# Patient Record
Sex: FEMALE | Race: WHITE | NOT HISPANIC OR LATINO | Employment: UNEMPLOYED | URBAN - METROPOLITAN AREA
[De-identification: names, ages, dates, MRNs, and addresses within clinical notes are randomized per-mention and may not be internally consistent; named-entity substitution may affect disease eponyms.]

---

## 2023-04-27 ENCOUNTER — LAB (OUTPATIENT)
Dept: LAB | Facility: LAB | Age: 68
End: 2023-04-27
Payer: MEDICARE

## 2023-04-27 ENCOUNTER — OFFICE VISIT (OUTPATIENT)
Dept: PRIMARY CARE | Facility: CLINIC | Age: 68
End: 2023-04-27
Payer: MEDICARE

## 2023-04-27 VITALS
OXYGEN SATURATION: 97 % | TEMPERATURE: 97.8 F | HEART RATE: 61 BPM | BODY MASS INDEX: 37.45 KG/M2 | SYSTOLIC BLOOD PRESSURE: 122 MMHG | WEIGHT: 233 LBS | HEIGHT: 66 IN | DIASTOLIC BLOOD PRESSURE: 78 MMHG

## 2023-04-27 DIAGNOSIS — R11.0 NAUSEA: ICD-10-CM

## 2023-04-27 DIAGNOSIS — Z13.820 ENCOUNTER FOR OSTEOPOROSIS SCREENING IN ASYMPTOMATIC POSTMENOPAUSAL PATIENT: ICD-10-CM

## 2023-04-27 DIAGNOSIS — E55.9 VITAMIN D DEFICIENCY: ICD-10-CM

## 2023-04-27 DIAGNOSIS — R42 DIZZINESS: ICD-10-CM

## 2023-04-27 DIAGNOSIS — H81.90 EPISODIC RECURRENT VERTIGO, UNSPECIFIED LATERALITY: ICD-10-CM

## 2023-04-27 DIAGNOSIS — E11.9 TYPE 2 DIABETES MELLITUS WITHOUT COMPLICATION, WITHOUT LONG-TERM CURRENT USE OF INSULIN (MULTI): ICD-10-CM

## 2023-04-27 DIAGNOSIS — E78.5 HYPERLIPIDEMIA, UNSPECIFIED HYPERLIPIDEMIA TYPE: ICD-10-CM

## 2023-04-27 DIAGNOSIS — I10 HYPERTENSION, UNSPECIFIED TYPE: Chronic | ICD-10-CM

## 2023-04-27 DIAGNOSIS — N95.1 MENOPAUSAL SYMPTOMS: ICD-10-CM

## 2023-04-27 DIAGNOSIS — Z78.0 ENCOUNTER FOR OSTEOPOROSIS SCREENING IN ASYMPTOMATIC POSTMENOPAUSAL PATIENT: ICD-10-CM

## 2023-04-27 DIAGNOSIS — I10 HYPERTENSION, UNSPECIFIED TYPE: Primary | Chronic | ICD-10-CM

## 2023-04-27 DIAGNOSIS — H91.90 HEARING LOSS, UNSPECIFIED HEARING LOSS TYPE, UNSPECIFIED LATERALITY: ICD-10-CM

## 2023-04-27 PROBLEM — J98.4 CALCIFIED GRANULOMA OF LUNG: Status: ACTIVE | Noted: 2023-04-27

## 2023-04-27 PROBLEM — Z96.1: Status: RESOLVED | Noted: 2021-01-08 | Resolved: 2023-04-27

## 2023-04-27 PROBLEM — Z86.2 HISTORY OF SARCOIDOSIS: Status: ACTIVE | Noted: 2023-04-27

## 2023-04-27 PROBLEM — H52.7 REFRACTIVE ERROR: Status: ACTIVE | Noted: 2020-11-06

## 2023-04-27 PROBLEM — H18.519 CORNEA GUTTATA: Status: RESOLVED | Noted: 2020-11-06 | Resolved: 2023-04-27

## 2023-04-27 PROBLEM — I34.1 MVP (MITRAL VALVE PROLAPSE): Status: ACTIVE | Noted: 2023-04-27

## 2023-04-27 PROBLEM — Z98.41: Status: RESOLVED | Noted: 2021-01-08 | Resolved: 2023-04-27

## 2023-04-27 PROBLEM — R51.9 HEADACHE: Status: ACTIVE | Noted: 2023-04-27

## 2023-04-27 PROBLEM — J84.10 CALCIFIED GRANULOMA OF LUNG (MULTI): Status: ACTIVE | Noted: 2023-04-27

## 2023-04-27 PROBLEM — H04.123 DRY EYE SYNDROME OF BOTH EYES: Status: RESOLVED | Noted: 2020-11-06 | Resolved: 2023-04-27

## 2023-04-27 PROBLEM — H25.812 COMBINED FORMS OF AGE-RELATED CATARACT OF LEFT EYE: Status: RESOLVED | Noted: 2020-11-06 | Resolved: 2023-04-27

## 2023-04-27 LAB
ALANINE AMINOTRANSFERASE (SGPT) (U/L) IN SER/PLAS: 17 U/L (ref 7–45)
ALBUMIN (G/DL) IN SER/PLAS: 3.5 G/DL (ref 3.4–5)
ALKALINE PHOSPHATASE (U/L) IN SER/PLAS: 66 U/L (ref 33–136)
ANION GAP IN SER/PLAS: 13 MMOL/L (ref 10–20)
ASPARTATE AMINOTRANSFERASE (SGOT) (U/L) IN SER/PLAS: 16 U/L (ref 9–39)
BASOPHILS (10*3/UL) IN BLOOD BY AUTOMATED COUNT: 0.04 X10E9/L (ref 0–0.1)
BASOPHILS/100 LEUKOCYTES IN BLOOD BY AUTOMATED COUNT: 0.5 % (ref 0–2)
BILIRUBIN TOTAL (MG/DL) IN SER/PLAS: 0.8 MG/DL (ref 0–1.2)
CALCIDIOL (25 OH VITAMIN D3) (NG/ML) IN SER/PLAS: 14 NG/ML
CALCIUM (MG/DL) IN SER/PLAS: 8.5 MG/DL (ref 8.6–10.3)
CARBON DIOXIDE, TOTAL (MMOL/L) IN SER/PLAS: 29 MMOL/L (ref 21–32)
CHLORIDE (MMOL/L) IN SER/PLAS: 98 MMOL/L (ref 98–107)
CHOLESTEROL (MG/DL) IN SER/PLAS: 123 MG/DL (ref 0–199)
CHOLESTEROL IN HDL (MG/DL) IN SER/PLAS: 44.6 MG/DL
CHOLESTEROL/HDL RATIO: 2.8
CREATININE (MG/DL) IN SER/PLAS: 0.69 MG/DL (ref 0.5–1.05)
EOSINOPHILS (10*3/UL) IN BLOOD BY AUTOMATED COUNT: 0.24 X10E9/L (ref 0–0.7)
EOSINOPHILS/100 LEUKOCYTES IN BLOOD BY AUTOMATED COUNT: 2.9 % (ref 0–6)
ERYTHROCYTE DISTRIBUTION WIDTH (RATIO) BY AUTOMATED COUNT: 13.1 % (ref 11.5–14.5)
ERYTHROCYTE MEAN CORPUSCULAR HEMOGLOBIN CONCENTRATION (G/DL) BY AUTOMATED: 33 G/DL (ref 32–36)
ERYTHROCYTE MEAN CORPUSCULAR VOLUME (FL) BY AUTOMATED COUNT: 94 FL (ref 80–100)
ERYTHROCYTES (10*6/UL) IN BLOOD BY AUTOMATED COUNT: 4.69 X10E12/L (ref 4–5.2)
GFR FEMALE: >90 ML/MIN/1.73M2
GLUCOSE (MG/DL) IN SER/PLAS: 278 MG/DL (ref 74–99)
HEMATOCRIT (%) IN BLOOD BY AUTOMATED COUNT: 44.2 % (ref 36–46)
HEMOGLOBIN (G/DL) IN BLOOD: 14.6 G/DL (ref 12–16)
IMMATURE GRANULOCYTES/100 LEUKOCYTES IN BLOOD BY AUTOMATED COUNT: 0.4 % (ref 0–0.9)
LDL: 36 MG/DL (ref 0–99)
LEUKOCYTES (10*3/UL) IN BLOOD BY AUTOMATED COUNT: 8.4 X10E9/L (ref 4.4–11.3)
LYMPHOCYTES (10*3/UL) IN BLOOD BY AUTOMATED COUNT: 2.14 X10E9/L (ref 1.2–4.8)
LYMPHOCYTES/100 LEUKOCYTES IN BLOOD BY AUTOMATED COUNT: 25.6 % (ref 13–44)
MONOCYTES (10*3/UL) IN BLOOD BY AUTOMATED COUNT: 0.46 X10E9/L (ref 0.1–1)
MONOCYTES/100 LEUKOCYTES IN BLOOD BY AUTOMATED COUNT: 5.5 % (ref 2–10)
NEUTROPHILS (10*3/UL) IN BLOOD BY AUTOMATED COUNT: 5.45 X10E9/L (ref 1.2–7.7)
NEUTROPHILS/100 LEUKOCYTES IN BLOOD BY AUTOMATED COUNT: 65.1 % (ref 40–80)
NON HDL CHOLESTEROL: 78 MG/DL
PLATELETS (10*3/UL) IN BLOOD AUTOMATED COUNT: 238 X10E9/L (ref 150–450)
POTASSIUM (MMOL/L) IN SER/PLAS: 4.8 MMOL/L (ref 3.5–5.3)
PROTEIN TOTAL: 5.2 G/DL (ref 6.4–8.2)
SODIUM (MMOL/L) IN SER/PLAS: 135 MMOL/L (ref 136–145)
THYROTROPIN (MIU/L) IN SER/PLAS BY DETECTION LIMIT <= 0.05 MIU/L: 3.23 MIU/L (ref 0.44–3.98)
TRIGLYCERIDE (MG/DL) IN SER/PLAS: 210 MG/DL (ref 0–149)
UREA NITROGEN (MG/DL) IN SER/PLAS: 17 MG/DL (ref 6–23)
VLDL: 42 MG/DL (ref 0–40)

## 2023-04-27 PROCEDURE — 82306 VITAMIN D 25 HYDROXY: CPT

## 2023-04-27 PROCEDURE — 1159F MED LIST DOCD IN RCRD: CPT | Performed by: FAMILY MEDICINE

## 2023-04-27 PROCEDURE — 99215 OFFICE O/P EST HI 40 MIN: CPT | Performed by: FAMILY MEDICINE

## 2023-04-27 PROCEDURE — 3078F DIAST BP <80 MM HG: CPT | Performed by: FAMILY MEDICINE

## 2023-04-27 PROCEDURE — 84443 ASSAY THYROID STIM HORMONE: CPT

## 2023-04-27 PROCEDURE — 83036 HEMOGLOBIN GLYCOSYLATED A1C: CPT

## 2023-04-27 PROCEDURE — 36415 COLL VENOUS BLD VENIPUNCTURE: CPT

## 2023-04-27 PROCEDURE — 80061 LIPID PANEL: CPT

## 2023-04-27 PROCEDURE — 1160F RVW MEDS BY RX/DR IN RCRD: CPT | Performed by: FAMILY MEDICINE

## 2023-04-27 PROCEDURE — 3074F SYST BP LT 130 MM HG: CPT | Performed by: FAMILY MEDICINE

## 2023-04-27 PROCEDURE — 80053 COMPREHEN METABOLIC PANEL: CPT

## 2023-04-27 PROCEDURE — 85025 COMPLETE CBC W/AUTO DIFF WBC: CPT

## 2023-04-27 PROCEDURE — 1036F TOBACCO NON-USER: CPT | Performed by: FAMILY MEDICINE

## 2023-04-27 RX ORDER — GLIPIZIDE 10 MG/1
10 TABLET ORAL
COMMUNITY
End: 2023-08-29 | Stop reason: ALTCHOICE

## 2023-04-27 RX ORDER — LISINOPRIL AND HYDROCHLOROTHIAZIDE 20; 25 MG/1; MG/1
1 TABLET ORAL DAILY
COMMUNITY
End: 2023-08-29 | Stop reason: SDUPTHER

## 2023-04-27 RX ORDER — METFORMIN HYDROCHLORIDE 500 MG/1
2000 TABLET, EXTENDED RELEASE ORAL DAILY
COMMUNITY
End: 2024-01-08 | Stop reason: SDUPTHER

## 2023-04-27 RX ORDER — ONDANSETRON 4 MG/1
4 TABLET, ORALLY DISINTEGRATING ORAL EVERY 8 HOURS PRN
Qty: 20 TABLET | Refills: 0 | Status: SHIPPED | OUTPATIENT
Start: 2023-04-27 | End: 2023-05-04

## 2023-04-27 RX ORDER — VENLAFAXINE HYDROCHLORIDE 75 MG/1
75 TABLET, EXTENDED RELEASE ORAL
COMMUNITY
Start: 2021-03-31 | End: 2023-08-29 | Stop reason: SDUPTHER

## 2023-04-27 RX ORDER — PIOGLITAZONEHYDROCHLORIDE 30 MG/1
45 TABLET ORAL DAILY
COMMUNITY
End: 2024-01-09 | Stop reason: WASHOUT

## 2023-04-27 RX ORDER — ATORVASTATIN CALCIUM 40 MG/1
40 TABLET, FILM COATED ORAL DAILY
COMMUNITY
Start: 2022-11-07 | End: 2023-12-06 | Stop reason: SDUPTHER

## 2023-04-27 RX ORDER — CEPHRADINE 250 MG
2 CAPSULE ORAL DAILY
COMMUNITY
Start: 2021-12-08

## 2023-04-27 RX ORDER — CICLOPIROX 80 MG/ML
SOLUTION TOPICAL
COMMUNITY
Start: 2021-02-23 | End: 2024-01-09 | Stop reason: WASHOUT

## 2023-04-27 ASSESSMENT — ENCOUNTER SYMPTOMS
MYALGIAS: 0
SLEEP DISTURBANCE: 0
BRUISES/BLEEDS EASILY: 0
SHORTNESS OF BREATH: 0
VOMITING: 0
DIZZINESS: 1
POLYDIPSIA: 0
FATIGUE: 0
ABDOMINAL DISTENTION: 0
HEADACHES: 0
PALPITATIONS: 0
DYSPHORIC MOOD: 0
CONSTIPATION: 0
DYSURIA: 0
DIFFICULTY URINATING: 0
POLYPHAGIA: 0
BLOOD IN STOOL: 0
NAUSEA: 0
ABDOMINAL PAIN: 0
DIARRHEA: 0
ARTHRALGIAS: 0

## 2023-04-27 NOTE — PROGRESS NOTES
"Subjective   Patient ID: Kaity Eric is a 67 y.o. female who presents for New Patient Visit, Diabetes (X\"years\". Discuss A1C testing.), and Dizziness (With hearing loss x\"months\".).    Diabetes  Hypoglycemia symptoms include dizziness. Pertinent negatives for hypoglycemia include no headaches. Pertinent negatives for diabetes include no chest pain, no fatigue, no polydipsia, no polyphagia and no polyuria.   Dizziness  Pertinent negatives include no abdominal pain, arthralgias, chest pain, fatigue, headaches, myalgias, nausea, rash or vomiting.      DM: due for recheck. Last a1c 7.4% per pt. Noticing BS increasing gradually. Currently following keto diet.     HTN: controlled    Vertigo: chronic for yr. Starts w/ ringing in ear(mostly rt side) then has vertigo, hearing loss then occ N/V. No assoc Has. PT in past did not help    Vit D/lipids: due for recheck.     Menopause: stable. Was placed on SNRI for hotflashes. Resolved on med. No issues w/ mood    Review of Systems   Constitutional:  Negative for fatigue.   HENT:          As above   Eyes:  Negative for visual disturbance.   Respiratory:  Negative for shortness of breath.    Cardiovascular:  Negative for chest pain and palpitations.   Gastrointestinal:  Negative for abdominal distention, abdominal pain, blood in stool, constipation, diarrhea, nausea and vomiting.   Endocrine: Negative for cold intolerance, heat intolerance, polydipsia, polyphagia and polyuria.   Genitourinary:  Negative for difficulty urinating and dysuria.   Musculoskeletal:  Negative for arthralgias and myalgias.   Skin:  Negative for rash.   Allergic/Immunologic: Negative for environmental allergies.   Neurological:  Positive for dizziness. Negative for headaches.   Hematological:  Does not bruise/bleed easily.   Psychiatric/Behavioral:  Negative for dysphoric mood and sleep disturbance.        Objective   /78   Pulse 61   Temp 36.6 °C (97.8 °F) (Temporal)   Ht 1.676 m (5' 6\")   " Wt 106 kg (233 lb)   SpO2 97%   BMI 37.61 kg/m²     Physical Exam  Vitals reviewed.   Constitutional:       Appearance: Normal appearance.   HENT:      Head: Normocephalic.      Right Ear: Tympanic membrane normal.      Left Ear: Tympanic membrane normal.      Nose: Nose normal.      Mouth/Throat:      Pharynx: Oropharynx is clear.   Eyes:      Pupils: Pupils are equal, round, and reactive to light.   Cardiovascular:      Rate and Rhythm: Normal rate and regular rhythm.      Pulses: Normal pulses.      Heart sounds: No murmur heard.  Pulmonary:      Effort: Pulmonary effort is normal. No respiratory distress.      Breath sounds: Normal breath sounds.   Abdominal:      General: Bowel sounds are normal.      Palpations: Abdomen is soft.      Tenderness: There is no abdominal tenderness. There is no guarding.   Musculoskeletal:      Right lower leg: Edema present.      Left lower leg: Edema present.   Skin:     General: Skin is warm.   Neurological:      General: No focal deficit present.      Mental Status: She is alert.      Cranial Nerves: No cranial nerve deficit.   Psychiatric:         Mood and Affect: Mood normal.         Assessment/Plan   Problem List Items Addressed This Visit          Circulatory    HTN (hypertension) - Primary (Chronic)    Relevant Orders    Comprehensive Metabolic Panel    Lipid Panel    TSH with reflex to Free T4 if abnormal       Endocrine/Metabolic    Type 2 diabetes mellitus without complication, without long-term current use of insulin (CMS/[facility])    Relevant Orders    Comprehensive Metabolic Panel    Hemoglobin A1C    TSH with reflex to Free T4 if abnormal       Other    Hyperlipidemia    Relevant Orders    Comprehensive Metabolic Panel    Lipid Panel    TSH with reflex to Free T4 if abnormal     Other Visit Diagnoses       Dizziness        Relevant Orders    CBC and Auto Differential    Vitamin D deficiency        Relevant Orders    Vitamin D 25-Hydroxy,Total    Episodic recurrent  vertigo, unspecified laterality        Relevant Orders    Referral to ENT    Menopausal symptoms        Relevant Orders    XR DEXA bone density    Hearing loss, unspecified hearing loss type, unspecified laterality        Relevant Orders    Referral to Audiology    Nausea        Relevant Medications    ondansetron ODT (Zofran-ODT) 4 mg disintegrating tablet    Encounter for osteoporosis screening in asymptomatic postmenopausal patient        Relevant Orders    XR DEXA bone density        Reviewed prior records available during visit    Time Spent  Prep time on day of patient encounter: 0 minutes  Time spent directly with patient, family or caregiver: 35 minutes  Additional Time Spent on Patient Care Activities: 0 minutes  Documentation Time: 10 minutes  Other Time Spent: 0 minutes  Total: 45 minutes

## 2023-04-27 NOTE — PATIENT INSTRUCTIONS
Obtain your blood work    You were referred to ENT and audiology    Recommend a predominant whole foods plant based diet.  Cut back on meat, dairy, salt and oils. Increase fiber in your diet.  Decrease alcohol as much as possible if you drink. Recommend regular exercise most days of the week.    Continue your current meds    Follow up in 3 months for recheck and medicare visit, sooner if needed

## 2023-04-28 ENCOUNTER — TELEPHONE (OUTPATIENT)
Dept: PRIMARY CARE | Facility: CLINIC | Age: 68
End: 2023-04-28
Payer: COMMERCIAL

## 2023-04-28 LAB
ESTIMATED AVERAGE GLUCOSE FOR HBA1C: 206 MG/DL
HEMOGLOBIN A1C/HEMOGLOBIN TOTAL IN BLOOD: 8.8 %

## 2023-04-28 NOTE — TELEPHONE ENCOUNTER
Her hemoglobin A1c is 8.8%. Triglycerides are elevated at 210 (goal <150). Vitamin D low at 14. All other labs normal. Will need to see what Dr. Taylor wants to do with her medications. (Please forward to IEL when done)

## 2023-04-28 NOTE — TELEPHONE ENCOUNTER
Patient is wanting her lab results really interested in the A1C. Is asking if another doctor can address this since IEL is out of the office today. Labs in chart.

## 2023-04-28 NOTE — TELEPHONE ENCOUNTER
Called and spoke with pt- informed of lab numbers. Any adjustments need made to patients meds? Thanks, CG

## 2023-05-04 ENCOUNTER — TELEPHONE (OUTPATIENT)
Dept: PRIMARY CARE | Facility: CLINIC | Age: 68
End: 2023-05-04
Payer: COMMERCIAL

## 2023-05-04 NOTE — TELEPHONE ENCOUNTER
Pt came in and stated she was contacted by us to see if she would like to start jardiance, pt said she is going to look into it and will let us know. She would prefer to try diet and exercise before adding more medications.

## 2023-05-09 NOTE — TELEPHONE ENCOUNTER
Pt called back stating she is willing to start medication - jardiance, please send to [facility]

## 2023-05-19 DIAGNOSIS — E11.9 TYPE 2 DIABETES MELLITUS WITHOUT COMPLICATION, WITHOUT LONG-TERM CURRENT USE OF INSULIN (MULTI): ICD-10-CM

## 2023-05-19 DIAGNOSIS — E55.9 VITAMIN D DEFICIENCY: ICD-10-CM

## 2023-05-19 DIAGNOSIS — I10 HYPERTENSION, UNSPECIFIED TYPE: Primary | Chronic | ICD-10-CM

## 2023-05-19 DIAGNOSIS — E78.5 HYPERLIPIDEMIA, UNSPECIFIED HYPERLIPIDEMIA TYPE: ICD-10-CM

## 2023-07-03 ENCOUNTER — TELEPHONE (OUTPATIENT)
Dept: PRIMARY CARE | Facility: CLINIC | Age: 68
End: 2023-07-03
Payer: COMMERCIAL

## 2023-07-03 NOTE — TELEPHONE ENCOUNTER
Pt called stating that since pt started jardiance, she has had issues with yeast infections... should she take 7 day treatment OTC of monistat??? Please advise. Pt states IEL stated this might happen. Pt would like answer today. Thx

## 2023-08-22 ENCOUNTER — LAB (OUTPATIENT)
Dept: LAB | Facility: LAB | Age: 68
End: 2023-08-22
Payer: MEDICARE

## 2023-08-22 DIAGNOSIS — E78.5 HYPERLIPIDEMIA, UNSPECIFIED HYPERLIPIDEMIA TYPE: ICD-10-CM

## 2023-08-22 DIAGNOSIS — E55.9 VITAMIN D DEFICIENCY: ICD-10-CM

## 2023-08-22 DIAGNOSIS — E11.9 TYPE 2 DIABETES MELLITUS WITHOUT COMPLICATION, WITHOUT LONG-TERM CURRENT USE OF INSULIN (MULTI): ICD-10-CM

## 2023-08-22 LAB
ALANINE AMINOTRANSFERASE (SGPT) (U/L) IN SER/PLAS: 19 U/L (ref 7–45)
ALBUMIN (G/DL) IN SER/PLAS: 4.1 G/DL (ref 3.4–5)
ALKALINE PHOSPHATASE (U/L) IN SER/PLAS: 76 U/L (ref 33–136)
ANION GAP IN SER/PLAS: 12 MMOL/L (ref 10–20)
ASPARTATE AMINOTRANSFERASE (SGOT) (U/L) IN SER/PLAS: 20 U/L (ref 9–39)
BILIRUBIN TOTAL (MG/DL) IN SER/PLAS: 0.7 MG/DL (ref 0–1.2)
CALCIDIOL (25 OH VITAMIN D3) (NG/ML) IN SER/PLAS: 26 NG/ML
CALCIUM (MG/DL) IN SER/PLAS: 9.2 MG/DL (ref 8.6–10.3)
CARBON DIOXIDE, TOTAL (MMOL/L) IN SER/PLAS: 26 MMOL/L (ref 21–32)
CHLORIDE (MMOL/L) IN SER/PLAS: 101 MMOL/L (ref 98–107)
CHOLESTEROL (MG/DL) IN SER/PLAS: 101 MG/DL (ref 0–199)
CHOLESTEROL IN HDL (MG/DL) IN SER/PLAS: 39.8 MG/DL
CHOLESTEROL/HDL RATIO: 2.5
CREATININE (MG/DL) IN SER/PLAS: 0.86 MG/DL (ref 0.5–1.05)
GFR FEMALE: 75 ML/MIN/1.73M2
GLUCOSE (MG/DL) IN SER/PLAS: 154 MG/DL (ref 74–99)
LDL: 28 MG/DL (ref 0–99)
POTASSIUM (MMOL/L) IN SER/PLAS: 4.2 MMOL/L (ref 3.5–5.3)
PROTEIN TOTAL: 6.4 G/DL (ref 6.4–8.2)
SODIUM (MMOL/L) IN SER/PLAS: 135 MMOL/L (ref 136–145)
TRIGLYCERIDE (MG/DL) IN SER/PLAS: 164 MG/DL (ref 0–149)
UREA NITROGEN (MG/DL) IN SER/PLAS: 14 MG/DL (ref 6–23)
VLDL: 33 MG/DL (ref 0–40)

## 2023-08-22 PROCEDURE — 83036 HEMOGLOBIN GLYCOSYLATED A1C: CPT

## 2023-08-22 PROCEDURE — 36415 COLL VENOUS BLD VENIPUNCTURE: CPT

## 2023-08-22 PROCEDURE — 80061 LIPID PANEL: CPT

## 2023-08-22 PROCEDURE — 80053 COMPREHEN METABOLIC PANEL: CPT

## 2023-08-22 PROCEDURE — 82306 VITAMIN D 25 HYDROXY: CPT

## 2023-08-23 ENCOUNTER — APPOINTMENT (OUTPATIENT)
Dept: PRIMARY CARE | Facility: CLINIC | Age: 68
End: 2023-08-23
Payer: MEDICARE

## 2023-08-23 LAB
ESTIMATED AVERAGE GLUCOSE FOR HBA1C: 183 MG/DL
HEMOGLOBIN A1C/HEMOGLOBIN TOTAL IN BLOOD: 8 %

## 2023-08-29 ENCOUNTER — OFFICE VISIT (OUTPATIENT)
Dept: PRIMARY CARE | Facility: CLINIC | Age: 68
End: 2023-08-29
Payer: MEDICARE

## 2023-08-29 ENCOUNTER — TELEPHONE (OUTPATIENT)
Dept: PRIMARY CARE | Facility: CLINIC | Age: 68
End: 2023-08-29

## 2023-08-29 VITALS
TEMPERATURE: 97.4 F | DIASTOLIC BLOOD PRESSURE: 76 MMHG | OXYGEN SATURATION: 97 % | HEART RATE: 88 BPM | SYSTOLIC BLOOD PRESSURE: 124 MMHG

## 2023-08-29 DIAGNOSIS — E55.9 VITAMIN D DEFICIENCY: ICD-10-CM

## 2023-08-29 DIAGNOSIS — I10 HYPERTENSION, UNSPECIFIED TYPE: Chronic | ICD-10-CM

## 2023-08-29 DIAGNOSIS — E78.5 HYPERLIPIDEMIA, UNSPECIFIED HYPERLIPIDEMIA TYPE: ICD-10-CM

## 2023-08-29 DIAGNOSIS — Z00.00 ROUTINE GENERAL MEDICAL EXAMINATION AT HEALTH CARE FACILITY: ICD-10-CM

## 2023-08-29 DIAGNOSIS — Z11.59 NEED FOR HEPATITIS C SCREENING TEST: ICD-10-CM

## 2023-08-29 DIAGNOSIS — N95.1 MENOPAUSE SYNDROME: ICD-10-CM

## 2023-08-29 DIAGNOSIS — J84.10 CALCIFIED GRANULOMA OF LUNG (MULTI): ICD-10-CM

## 2023-08-29 DIAGNOSIS — E11.9 TYPE 2 DIABETES MELLITUS WITHOUT COMPLICATION, WITHOUT LONG-TERM CURRENT USE OF INSULIN (MULTI): ICD-10-CM

## 2023-08-29 DIAGNOSIS — N76.1 SUBACUTE VAGINITIS: ICD-10-CM

## 2023-08-29 DIAGNOSIS — J02.9 SORE THROAT: Primary | ICD-10-CM

## 2023-08-29 DIAGNOSIS — Z12.31 ENCOUNTER FOR SCREENING MAMMOGRAM FOR BREAST CANCER: ICD-10-CM

## 2023-08-29 DIAGNOSIS — R05.9 COUGH, UNSPECIFIED TYPE: ICD-10-CM

## 2023-08-29 LAB — POC RAPID STREP: NEGATIVE

## 2023-08-29 PROCEDURE — 99214 OFFICE O/P EST MOD 30 MIN: CPT | Performed by: FAMILY MEDICINE

## 2023-08-29 PROCEDURE — G0402 INITIAL PREVENTIVE EXAM: HCPCS | Performed by: FAMILY MEDICINE

## 2023-08-29 PROCEDURE — G0446 INTENS BEHAVE THER CARDIO DX: HCPCS | Performed by: FAMILY MEDICINE

## 2023-08-29 PROCEDURE — 3074F SYST BP LT 130 MM HG: CPT | Performed by: FAMILY MEDICINE

## 2023-08-29 PROCEDURE — 3052F HG A1C>EQUAL 8.0%<EQUAL 9.0%: CPT | Performed by: FAMILY MEDICINE

## 2023-08-29 PROCEDURE — 1036F TOBACCO NON-USER: CPT | Performed by: FAMILY MEDICINE

## 2023-08-29 PROCEDURE — 1159F MED LIST DOCD IN RCRD: CPT | Performed by: FAMILY MEDICINE

## 2023-08-29 PROCEDURE — 1160F RVW MEDS BY RX/DR IN RCRD: CPT | Performed by: FAMILY MEDICINE

## 2023-08-29 PROCEDURE — 87635 SARS-COV-2 COVID-19 AMP PRB: CPT

## 2023-08-29 PROCEDURE — 1170F FXNL STATUS ASSESSED: CPT | Performed by: FAMILY MEDICINE

## 2023-08-29 PROCEDURE — 3078F DIAST BP <80 MM HG: CPT | Performed by: FAMILY MEDICINE

## 2023-08-29 PROCEDURE — 87880 STREP A ASSAY W/OPTIC: CPT | Performed by: FAMILY MEDICINE

## 2023-08-29 RX ORDER — VENLAFAXINE HYDROCHLORIDE 75 MG/1
75 TABLET, EXTENDED RELEASE ORAL DAILY
Qty: 90 TABLET | Refills: 0 | Status: SHIPPED | OUTPATIENT
Start: 2023-08-29 | End: 2023-08-31

## 2023-08-29 RX ORDER — SITAGLIPTIN 100 MG/1
100 TABLET, FILM COATED ORAL DAILY
COMMUNITY
Start: 2023-08-28 | End: 2023-12-15

## 2023-08-29 RX ORDER — FLUCONAZOLE 150 MG/1
150 TABLET ORAL ONCE
Qty: 1 TABLET | Refills: 0 | Status: SHIPPED | OUTPATIENT
Start: 2023-08-29 | End: 2023-08-29

## 2023-08-29 RX ORDER — BLOOD SUGAR DIAGNOSTIC
STRIP MISCELLANEOUS
COMMUNITY
Start: 2023-08-01 | End: 2024-01-08 | Stop reason: SDUPTHER

## 2023-08-29 RX ORDER — ICOSAPENT ETHYL 1 G/1
2 CAPSULE ORAL 2 TIMES DAILY
COMMUNITY
Start: 2023-08-01 | End: 2024-01-09 | Stop reason: SDUPTHER

## 2023-08-29 RX ORDER — GLIMEPIRIDE 2 MG/1
2 TABLET ORAL DAILY
COMMUNITY
Start: 2023-08-28 | End: 2023-12-15

## 2023-08-29 RX ORDER — LISINOPRIL AND HYDROCHLOROTHIAZIDE 20; 25 MG/1; MG/1
1 TABLET ORAL DAILY
Qty: 90 TABLET | Refills: 0 | Status: SHIPPED | OUTPATIENT
Start: 2023-08-29 | End: 2023-12-06 | Stop reason: SDUPTHER

## 2023-08-29 ASSESSMENT — ENCOUNTER SYMPTOMS
NAUSEA: 0
SHORTNESS OF BREATH: 0
MYALGIAS: 0
POLYDIPSIA: 0
HEADACHES: 0
DIFFICULTY URINATING: 0
BLOOD IN STOOL: 0
CONSTIPATION: 0
DIZZINESS: 0
VOMITING: 0
ARTHRALGIAS: 0
SORE THROAT: 1
FATIGUE: 0
POLYPHAGIA: 0
ABDOMINAL PAIN: 0
PALPITATIONS: 0
DYSPHORIC MOOD: 0
DYSURIA: 0
SLEEP DISTURBANCE: 0
DIARRHEA: 0
COUGH: 1

## 2023-08-29 ASSESSMENT — ACTIVITIES OF DAILY LIVING (ADL)
BATHING: INDEPENDENT
GROCERY_SHOPPING: INDEPENDENT
TAKING_MEDICATION: INDEPENDENT
MANAGING_FINANCES: INDEPENDENT
DOING_HOUSEWORK: INDEPENDENT
DRESSING: INDEPENDENT

## 2023-08-29 ASSESSMENT — PATIENT HEALTH QUESTIONNAIRE - PHQ9
SUM OF ALL RESPONSES TO PHQ9 QUESTIONS 1 AND 2: 0
1. LITTLE INTEREST OR PLEASURE IN DOING THINGS: NOT AT ALL
2. FEELING DOWN, DEPRESSED OR HOPELESS: NOT AT ALL

## 2023-08-29 NOTE — PATIENT INSTRUCTIONS
Recommend a predominant whole foods plant based diet.  Cut back on meat, dairy, salt and oils. Increase fiber in your diet.  Decrease alcohol as much as possible if you drink. Recommend regular exercise most days of the week.    Follow up with your specialists as scheduled    Recommend flu shot, prevnar 20 or pneumovax 23 in 1 month    Start diflucan. Hold statin while on med    Take 2000U vit D OTC daily    Continue your other meds    Follow up in 3 months, sooner if needed

## 2023-08-29 NOTE — PROGRESS NOTES
Subjective   Reason for Visit: Kaity Eric is an 67 y.o. female here for a Medicare Wellness visit and multiple issues.     Past Medical, Surgical, and Family History reviewed and updated in chart.    Reviewed all medications by prescribing practitioner or clinical pharmacist (such as prescriptions, OTCs, herbal therapies and supplements) and documented in the medical record.    Cough  Associated symptoms include a sore throat. Pertinent negatives include no chest pain, ear pain, headaches, myalgias, rash or shortness of breath.   Sore Throat   Associated symptoms include coughing. Pertinent negatives include no abdominal pain, diarrhea, ear pain, headaches, shortness of breath or vomiting.       Patient Care Team:  Mei Taylor DO as PCP - General (Family Medicine)   Dr. Hoffmann: cardiology  Dr. Cr: endocrinology  Dr. Hercules: pulm    Vit D: low but improved 26(14)  DM: stable. A1c 8(8.8). saw endo yesterday. Switched to januvia and amaryl.   Vaginitis: has recurrent issues w/ jardiance. On OTC meds. Slightly helps  Lipids: high. HDL 39, LDL 28,   BMI: high.   Granuloma: followed by pulm  Sore throat/cough: onset for several days. +sick contacts. +cough, sore throat. No F/Chills/CP/SOB    Review of Systems   Constitutional:  Negative for fatigue.   HENT:  Positive for sore throat. Negative for ear pain.    Eyes:  Negative for visual disturbance.   Respiratory:  Positive for cough. Negative for shortness of breath.    Cardiovascular:  Negative for chest pain and palpitations.   Gastrointestinal:  Negative for abdominal pain, blood in stool, constipation, diarrhea, nausea and vomiting.   Endocrine: Negative for cold intolerance, heat intolerance, polydipsia, polyphagia and polyuria.   Genitourinary:  Negative for difficulty urinating and dysuria.   Musculoskeletal:  Negative for arthralgias and myalgias.   Skin:  Negative for rash.   Neurological:  Negative for dizziness and headaches.    Psychiatric/Behavioral:  Negative for dysphoric mood and sleep disturbance.        Objective   Vitals:  /76   Pulse 88   Temp 36.3 °C (97.4 °F)   SpO2 97%       Physical Exam  Vitals and nursing note reviewed.   Constitutional:       General: She is not in acute distress.     Appearance: Normal appearance. She is not toxic-appearing.   HENT:      Head: Normocephalic.      Right Ear: Tympanic membrane normal.      Left Ear: Tympanic membrane normal.      Nose: Nose normal.      Mouth/Throat:      Pharynx: Oropharynx is clear.   Eyes:      General: No scleral icterus.     Pupils: Pupils are equal, round, and reactive to light.   Neck:      Vascular: No carotid bruit.   Cardiovascular:      Rate and Rhythm: Normal rate and regular rhythm.      Pulses: Normal pulses.      Heart sounds: No murmur heard.  Pulmonary:      Effort: Pulmonary effort is normal. No respiratory distress.      Breath sounds: Normal breath sounds.   Abdominal:      General: Bowel sounds are normal.      Palpations: Abdomen is soft.      Tenderness: There is no abdominal tenderness. There is no guarding.   Musculoskeletal:         General: No tenderness.      Right lower leg: No edema.      Left lower leg: No edema.   Skin:     General: Skin is warm.   Neurological:      General: No focal deficit present.      Mental Status: She is alert.      Cranial Nerves: No cranial nerve deficit.   Psychiatric:         Mood and Affect: Mood normal.         Assessment/Plan   Problem List Items Addressed This Visit       Hyperlipidemia    Relevant Orders    Comprehensive Metabolic Panel    Lipid Panel    Calcified granuloma of lung (CMS/HCC)    Type 2 diabetes mellitus without complication, without long-term current use of insulin (CMS/HCC)    Overview     Last Assessment & Plan: Formatting of this note might be different from the original. - no diabetic retinopathy noted on exam - Counseled on importance of controlling HgbA1c to prevent future  diabetic changes - Letter to PCP sent - will follow up in one year for annual diabetic exam - due for annual DOUGLAS in Nov 2021 Last Assessment & Plan: Formatting of this note might be different from the original. - No retinopathy on exam OU. - Patient was counseled regarding the importance of optimization of blood glucose and pressure control, as well as regular follow up with PCP. Patient demonstrated understanding. Letter sent to PCP. - Follow-up as below.         Relevant Orders    Comprehensive Metabolic Panel    HTN (hypertension) (Chronic)    Relevant Medications    lisinopriL-hydrochlorothiazide 20-25 mg tablet    Other Relevant Orders    Comprehensive Metabolic Panel    Menopause syndrome    Relevant Medications    venlafaxine 75 mg 24 hr tablet     Other Visit Diagnoses       Sore throat    -  Primary    Relevant Orders    POCT Rapid Strep A manually resulted (Completed)    Encounter for screening mammogram for breast cancer        Relevant Orders    BI mammo bilateral screening tomosynthesis    Need for hepatitis C screening test        Relevant Orders    Hepatitis C antibody    Routine general medical examination at health care facility        Subacute vaginitis        Relevant Medications    fluconazole (Diflucan) 150 mg tablet    Cough, unspecified type        Relevant Orders    Sars-CoV-2 PCR, Symptomatic    Vitamin D deficiency        Relevant Orders    Vitamin D 25-Hydroxy,Total        Discussed blood work and wellness issues. Reviewed screenings and immunizations. Recommendations given    ASCVD risk counseling:  discussed ASCVD risk.  Aspirin not indicated at this time. Lifestyle modifications including nutritional choices, exercise and trying to eliminate habits contributing to risk were discussed. Patient agreeable to make efforts to continue to control their current cardiovascular risk factors. Pt has Ca score test in 2020. On statin. Seeing cardiology

## 2023-08-29 NOTE — TELEPHONE ENCOUNTER
Venlafaxine requires PA:    It looks like venlafaxine HCL and/or  Venladaxine HCL ER is covered.    If you want to change, please send new rx to [facility].  If PA, please advise on failed meds

## 2023-08-30 LAB — SARS-COV-2 RESULT: NOT DETECTED

## 2023-08-31 RX ORDER — VENLAFAXINE HYDROCHLORIDE 75 MG/1
75 TABLET, EXTENDED RELEASE ORAL DAILY
Qty: 90 TABLET | Refills: 0 | Status: CANCELLED | OUTPATIENT
Start: 2023-08-31 | End: 2023-11-29

## 2023-08-31 RX ORDER — VENLAFAXINE HYDROCHLORIDE 75 MG/1
75 CAPSULE, EXTENDED RELEASE ORAL DAILY
Qty: 90 CAPSULE | Refills: 0 | Status: SHIPPED | OUTPATIENT
Start: 2023-08-31 | End: 2023-12-06 | Stop reason: SDUPTHER

## 2023-09-01 ENCOUNTER — TELEPHONE (OUTPATIENT)
Dept: PRIMARY CARE | Facility: CLINIC | Age: 68
End: 2023-09-01
Payer: MEDICARE

## 2023-09-01 DIAGNOSIS — R05.1 ACUTE COUGH: Primary | ICD-10-CM

## 2023-09-01 RX ORDER — BENZONATATE 100 MG/1
100 CAPSULE ORAL 3 TIMES DAILY PRN
Qty: 42 CAPSULE | Refills: 0 | Status: SHIPPED | OUTPATIENT
Start: 2023-09-01 | End: 2023-10-01

## 2023-09-01 NOTE — TELEPHONE ENCOUNTER
I sent in a cough medication to [facility] for the patient. If worsening over the weekend, recommend re-evaluation in Urgent Care.

## 2023-09-01 NOTE — TELEPHONE ENCOUNTER
"Pt returned call- informed of provider appendage. Pt states she has a \"nasty\" cough for 1 week now and is curious if there is medication that doc would want to RX now that we know it is not covid? Pt is also asking how long she should wear a mask around her mom who is 92? Pt would like a CB before the holiday weekend if possible. Are you able to address this for IEL? Thanks, CG  "

## 2023-09-05 ENCOUNTER — OFFICE VISIT (OUTPATIENT)
Dept: PRIMARY CARE | Facility: CLINIC | Age: 68
End: 2023-09-05
Payer: MEDICARE

## 2023-09-05 VITALS
DIASTOLIC BLOOD PRESSURE: 80 MMHG | TEMPERATURE: 96.9 F | HEART RATE: 70 BPM | OXYGEN SATURATION: 89 % | SYSTOLIC BLOOD PRESSURE: 122 MMHG

## 2023-09-05 DIAGNOSIS — R42 DIZZINESS: ICD-10-CM

## 2023-09-05 DIAGNOSIS — R05.2 SUBACUTE COUGH: Primary | ICD-10-CM

## 2023-09-05 DIAGNOSIS — H61.23 BILATERAL IMPACTED CERUMEN: ICD-10-CM

## 2023-09-05 DIAGNOSIS — K11.7 DROOLING FROM RIGHT SIDE OF MOUTH: ICD-10-CM

## 2023-09-05 PROCEDURE — 3079F DIAST BP 80-89 MM HG: CPT | Performed by: FAMILY MEDICINE

## 2023-09-05 PROCEDURE — 99214 OFFICE O/P EST MOD 30 MIN: CPT | Performed by: FAMILY MEDICINE

## 2023-09-05 PROCEDURE — 1036F TOBACCO NON-USER: CPT | Performed by: FAMILY MEDICINE

## 2023-09-05 PROCEDURE — 1160F RVW MEDS BY RX/DR IN RCRD: CPT | Performed by: FAMILY MEDICINE

## 2023-09-05 PROCEDURE — 1159F MED LIST DOCD IN RCRD: CPT | Performed by: FAMILY MEDICINE

## 2023-09-05 PROCEDURE — 3052F HG A1C>EQUAL 8.0%<EQUAL 9.0%: CPT | Performed by: FAMILY MEDICINE

## 2023-09-05 PROCEDURE — 3074F SYST BP LT 130 MM HG: CPT | Performed by: FAMILY MEDICINE

## 2023-09-05 ASSESSMENT — ENCOUNTER SYMPTOMS: FATIGUE: 0

## 2023-09-05 NOTE — PROGRESS NOTES
Subjective   Patient ID: Kaity Eric is a 67 y.o. female who presents for Dizziness and Cough (Ears feel clogged ).    Kaity presents to discuss ear issue. It feels like both of her ears are plugged. Feels like wax stuck. Hearing changed. No drainage from ears.     Also has noticed drooling out of right side of face. Happens intermittently when eating. Has not noticed any facial drooping. Also has had extensive work-up for dizziness without improvement. Happens intermittently.          Review of Systems   Constitutional:  Negative for fatigue.   HENT:  Positive for congestion. Negative for ear discharge and ear pain.        Objective   /80   Pulse 70   Temp 36.1 °C (96.9 °F)   SpO2 (!) 89%     Physical Exam  Constitutional:       General: She is not in acute distress.     Appearance: Normal appearance.   HENT:      Head: Normocephalic.      Mouth/Throat:      Mouth: Mucous membranes are moist.   Eyes:      Extraocular Movements: Extraocular movements intact.      Conjunctiva/sclera: Conjunctivae normal.   Cardiovascular:      Rate and Rhythm: Normal rate and regular rhythm.      Heart sounds: No murmur heard.  Pulmonary:      Breath sounds: No wheezing or rhonchi.   Musculoskeletal:      Cervical back: Neck supple.   Skin:     General: Skin is warm and dry.   Neurological:      Mental Status: She is alert.      Cranial Nerves: No cranial nerve deficit.      Motor: No weakness.      Coordination: Coordination normal.      Gait: Gait normal.   Psychiatric:         Mood and Affect: Mood normal.         Behavior: Behavior normal.         Assessment/Plan   Diagnoses and all orders for this visit:  Subacute cough  Comments:  Improving.  Bilateral impacted cerumen  -     Ear Cerumen Removal; Future  Dizziness  Comments:  Refer to neurology.  Orders:  -     Referral to Neurology; Future  -     MR brain wo IV contrast; Future  Drooling from right side of mouth  Comments:  No CN nerve deficit observed on exam.  With dizziness and drooling, recommend check MRI and discuss with neurology.  Orders:  -     Referral to Neurology; Future  -     MR brain wo IV contrast; Future

## 2023-09-26 ENCOUNTER — TELEPHONE (OUTPATIENT)
Dept: PRIMARY CARE | Facility: CLINIC | Age: 68
End: 2023-09-26
Payer: MEDICARE

## 2023-09-26 DIAGNOSIS — Z12.31 ENCOUNTER FOR SCREENING MAMMOGRAM FOR MALIGNANT NEOPLASM OF BREAST: Primary | ICD-10-CM

## 2023-09-26 NOTE — TELEPHONE ENCOUNTER
"Holley Flores, DO  [facility] Clinical Support Staff  Please let patient know that there were no acute findings on her brain MRI.  She does have some small blood vessel changes.  Controlling blood pressure, diabetes, and high cholesterol is important to prevent the progression of these changes.  There were no findings to explain her drooling.  Recommend discuss further with her PCP.      Spoke to pt and informed of [facility] msg.    Pt states she needs a new mammogram order- she uses \"outside\"  facility, I am not sure if that's why the other order from 8/29 was [place], but regardless, pt needs a new order, so she can get mammo done, it MUST be a 3D mammo        "

## 2023-11-29 ENCOUNTER — APPOINTMENT (OUTPATIENT)
Dept: PRIMARY CARE | Facility: CLINIC | Age: 68
End: 2023-11-29
Payer: MEDICARE

## 2023-12-04 ENCOUNTER — LAB (OUTPATIENT)
Dept: LAB | Facility: LAB | Age: 68
End: 2023-12-04
Payer: MEDICARE

## 2023-12-04 DIAGNOSIS — I10 HYPERTENSION, UNSPECIFIED TYPE: Chronic | ICD-10-CM

## 2023-12-04 DIAGNOSIS — Z11.59 NEED FOR HEPATITIS C SCREENING TEST: ICD-10-CM

## 2023-12-04 DIAGNOSIS — E55.9 VITAMIN D DEFICIENCY: ICD-10-CM

## 2023-12-04 DIAGNOSIS — E11.9 TYPE 2 DIABETES MELLITUS WITHOUT COMPLICATION, WITHOUT LONG-TERM CURRENT USE OF INSULIN (MULTI): ICD-10-CM

## 2023-12-04 DIAGNOSIS — E78.5 HYPERLIPIDEMIA, UNSPECIFIED HYPERLIPIDEMIA TYPE: ICD-10-CM

## 2023-12-04 LAB
25(OH)D3 SERPL-MCNC: 24 NG/ML (ref 30–100)
ALBUMIN SERPL BCP-MCNC: 3.8 G/DL (ref 3.4–5)
ALP SERPL-CCNC: 58 U/L (ref 33–136)
ALT SERPL W P-5'-P-CCNC: 12 U/L (ref 7–45)
ANION GAP SERPL CALC-SCNC: 11 MMOL/L (ref 10–20)
AST SERPL W P-5'-P-CCNC: 15 U/L (ref 9–39)
BILIRUB SERPL-MCNC: 0.4 MG/DL (ref 0–1.2)
BUN SERPL-MCNC: 13 MG/DL (ref 6–23)
CALCIUM SERPL-MCNC: 8.4 MG/DL (ref 8.6–10.3)
CHLORIDE SERPL-SCNC: 108 MMOL/L (ref 98–107)
CHOLEST SERPL-MCNC: 108 MG/DL (ref 0–199)
CHOLESTEROL/HDL RATIO: 2.4
CO2 SERPL-SCNC: 26 MMOL/L (ref 21–32)
CREAT SERPL-MCNC: 0.61 MG/DL (ref 0.5–1.05)
GFR SERPL CREATININE-BSD FRML MDRD: >90 ML/MIN/1.73M*2
GLUCOSE SERPL-MCNC: 144 MG/DL (ref 74–99)
HDLC SERPL-MCNC: 44.2 MG/DL
LDLC SERPL CALC-MCNC: 46 MG/DL
NON HDL CHOLESTEROL: 64 MG/DL (ref 0–149)
POTASSIUM SERPL-SCNC: 4.5 MMOL/L (ref 3.5–5.3)
PROT SERPL-MCNC: 5.9 G/DL (ref 6.4–8.2)
SODIUM SERPL-SCNC: 140 MMOL/L (ref 136–145)
TRIGL SERPL-MCNC: 87 MG/DL (ref 0–149)
VLDL: 17 MG/DL (ref 0–40)

## 2023-12-04 PROCEDURE — 86803 HEPATITIS C AB TEST: CPT

## 2023-12-04 PROCEDURE — 80061 LIPID PANEL: CPT

## 2023-12-04 PROCEDURE — 36415 COLL VENOUS BLD VENIPUNCTURE: CPT

## 2023-12-04 PROCEDURE — 80053 COMPREHEN METABOLIC PANEL: CPT

## 2023-12-04 PROCEDURE — 82306 VITAMIN D 25 HYDROXY: CPT

## 2023-12-05 LAB — HCV AB SER QL: NONREACTIVE

## 2023-12-06 ENCOUNTER — TELEPHONE (OUTPATIENT)
Dept: PRIMARY CARE | Facility: CLINIC | Age: 68
End: 2023-12-06

## 2023-12-06 ENCOUNTER — OFFICE VISIT (OUTPATIENT)
Dept: PRIMARY CARE | Facility: CLINIC | Age: 68
End: 2023-12-06
Payer: MEDICARE

## 2023-12-06 VITALS
WEIGHT: 232 LBS | OXYGEN SATURATION: 99 % | HEART RATE: 64 BPM | SYSTOLIC BLOOD PRESSURE: 122 MMHG | BODY MASS INDEX: 37.45 KG/M2 | DIASTOLIC BLOOD PRESSURE: 70 MMHG

## 2023-12-06 DIAGNOSIS — M54.6 THORACIC SPINE PAIN: ICD-10-CM

## 2023-12-06 DIAGNOSIS — I10 HYPERTENSION, UNSPECIFIED TYPE: Chronic | ICD-10-CM

## 2023-12-06 DIAGNOSIS — N95.1 MENOPAUSE SYNDROME: ICD-10-CM

## 2023-12-06 DIAGNOSIS — M81.0 OSTEOPOROSIS, UNSPECIFIED OSTEOPOROSIS TYPE, UNSPECIFIED PATHOLOGICAL FRACTURE PRESENCE: ICD-10-CM

## 2023-12-06 DIAGNOSIS — I73.9 PVD (PERIPHERAL VASCULAR DISEASE) (CMS-HCC): ICD-10-CM

## 2023-12-06 DIAGNOSIS — E66.01 OBESITY, MORBID (MULTI): ICD-10-CM

## 2023-12-06 DIAGNOSIS — K11.7 DROOLING FROM RIGHT SIDE OF MOUTH: ICD-10-CM

## 2023-12-06 DIAGNOSIS — E11.49 DIABETES MELLITUS TYPE 2 WITH NEUROLOGICAL MANIFESTATIONS (MULTI): ICD-10-CM

## 2023-12-06 DIAGNOSIS — Z12.31 ENCOUNTER FOR SCREENING MAMMOGRAM FOR MALIGNANT NEOPLASM OF BREAST: ICD-10-CM

## 2023-12-06 DIAGNOSIS — E11.9 TYPE 2 DIABETES MELLITUS WITHOUT COMPLICATION, WITHOUT LONG-TERM CURRENT USE OF INSULIN (MULTI): Primary | ICD-10-CM

## 2023-12-06 DIAGNOSIS — E78.5 HYPERLIPIDEMIA, UNSPECIFIED HYPERLIPIDEMIA TYPE: ICD-10-CM

## 2023-12-06 PROCEDURE — 1159F MED LIST DOCD IN RCRD: CPT | Performed by: FAMILY MEDICINE

## 2023-12-06 PROCEDURE — 3074F SYST BP LT 130 MM HG: CPT | Performed by: FAMILY MEDICINE

## 2023-12-06 PROCEDURE — 3052F HG A1C>EQUAL 8.0%<EQUAL 9.0%: CPT | Performed by: FAMILY MEDICINE

## 2023-12-06 PROCEDURE — 3048F LDL-C <100 MG/DL: CPT | Performed by: FAMILY MEDICINE

## 2023-12-06 PROCEDURE — 1160F RVW MEDS BY RX/DR IN RCRD: CPT | Performed by: FAMILY MEDICINE

## 2023-12-06 PROCEDURE — 1036F TOBACCO NON-USER: CPT | Performed by: FAMILY MEDICINE

## 2023-12-06 PROCEDURE — 3078F DIAST BP <80 MM HG: CPT | Performed by: FAMILY MEDICINE

## 2023-12-06 PROCEDURE — 99215 OFFICE O/P EST HI 40 MIN: CPT | Performed by: FAMILY MEDICINE

## 2023-12-06 RX ORDER — VENLAFAXINE HYDROCHLORIDE 75 MG/1
75 CAPSULE, EXTENDED RELEASE ORAL DAILY
Qty: 90 CAPSULE | Refills: 1 | Status: SHIPPED | OUTPATIENT
Start: 2023-12-06 | End: 2024-05-29 | Stop reason: SDUPTHER

## 2023-12-06 RX ORDER — LISINOPRIL AND HYDROCHLOROTHIAZIDE 20; 25 MG/1; MG/1
1 TABLET ORAL DAILY
Qty: 90 TABLET | Refills: 1 | Status: SHIPPED | OUTPATIENT
Start: 2023-12-06 | End: 2024-05-06 | Stop reason: SDUPTHER

## 2023-12-06 RX ORDER — ATORVASTATIN CALCIUM 40 MG/1
40 TABLET, FILM COATED ORAL DAILY
Qty: 90 TABLET | Refills: 1 | Status: SHIPPED | OUTPATIENT
Start: 2023-12-06 | End: 2024-01-09 | Stop reason: SDUPTHER

## 2023-12-06 ASSESSMENT — ENCOUNTER SYMPTOMS
MYALGIAS: 0
HYPERTENSION: 1
NAUSEA: 0
DYSURIA: 0
DIZZINESS: 0
SLEEP DISTURBANCE: 0
FATIGUE: 0
POLYDIPSIA: 0
POLYPHAGIA: 0
ABDOMINAL PAIN: 0
CONSTIPATION: 0
PALPITATIONS: 0
VOMITING: 0
DIARRHEA: 0
SHORTNESS OF BREATH: 0
DYSPHORIC MOOD: 0
BLOOD IN STOOL: 0
HEADACHES: 0

## 2023-12-06 NOTE — TELEPHONE ENCOUNTER
Pt states mammo entered was for a normal mammo and she would like a 3D scanning can we update this for pt pls advise

## 2023-12-06 NOTE — PATIENT INSTRUCTIONS
Recommend a predominant low fat whole foods plant based diet.  Cut back on meat, dairy, processed carbs, salt and oils. Increase fiber in your diet.  Decrease alcohol as much as possible if you drink. Recommend regular exercise most days of the week.    Continue your current meds    Follow up with your specialists as scheduled    Look into your pnuemonia vaccine history. You may need pneumovax 23.     Take vit D 2000U daily    Recommend stretching. If not helping, ok to call for PT order    Return in 6 months, sooner if needed

## 2023-12-06 NOTE — PROGRESS NOTES
Subjective   Patient ID: Kaity Eric is a 68 y.o. female who presents for Hyperlipidemia and Hypertension (recheck) and multiple issues    Hyperlipidemia  Pertinent negatives include no chest pain, myalgias or shortness of breath.   Hypertension  Pertinent negatives include no chest pain, headaches, palpitations or shortness of breath.      DM; now seeing endo who made several changes  Back pain: Onset x 10 days. No injury.  Mostly right shoulder blade.  No medications for symptoms.  No radiation  HTN: controlled  Lipids: controlled. HDL 44, LDL 46, TG 87  Vit D: low 24. Taking 2000U 4x per wk.   Osteoporosis: Seen on recent DEXA scan.  Does not want to begin medication due to concern of side effects  Drooling: Persistent.  MRI head negative for acute findings.  Has been having symptoms for the past year    Review of Systems   Constitutional:  Negative for fatigue.   HENT: Negative.     Eyes:  Negative for visual disturbance.   Respiratory:  Negative for shortness of breath.    Cardiovascular:  Negative for chest pain and palpitations.   Gastrointestinal:  Negative for abdominal pain, blood in stool, constipation, diarrhea, nausea and vomiting.   Endocrine: Negative for cold intolerance, heat intolerance, polydipsia, polyphagia and polyuria.   Genitourinary:  Negative for dysuria.   Musculoskeletal:  Negative for myalgias.   Skin:  Negative for rash.   Neurological:  Negative for dizziness and headaches.   Psychiatric/Behavioral:  Negative for dysphoric mood and sleep disturbance.        Objective   /70   Pulse 64   Wt 105 kg (232 lb)   SpO2 99%   BMI 37.45 kg/m²     Physical Exam  Vitals and nursing note reviewed.   Constitutional:       General: She is not in acute distress.     Appearance: Normal appearance.   HENT:      Head: Normocephalic.      Mouth/Throat:      Pharynx: Oropharynx is clear.   Eyes:      Pupils: Pupils are equal, round, and reactive to light.   Neck:      Vascular: No carotid  bruit.   Cardiovascular:      Rate and Rhythm: Normal rate and regular rhythm.      Heart sounds: No murmur heard.  Pulmonary:      Effort: Pulmonary effort is normal. No respiratory distress.      Breath sounds: Normal breath sounds.   Abdominal:      General: Bowel sounds are normal.      Palpations: Abdomen is soft.      Tenderness: There is no abdominal tenderness. There is no guarding.   Musculoskeletal:         General: Tenderness (Mostly to the right shoulder blade. +discomfort rt anterior rt rib with reproduction of pain to back.) present.      Cervical back: Neck supple.      Right lower leg: Edema present.      Left lower leg: Edema present.   Lymphadenopathy:      Cervical: No cervical adenopathy.   Skin:     General: Skin is warm.   Neurological:      General: No focal deficit present.      Mental Status: She is alert.      Cranial Nerves: No cranial nerve deficit.   Psychiatric:         Mood and Affect: Mood normal.         Assessment/Plan   Problem List Items Addressed This Visit             ICD-10-CM    Hyperlipidemia E78.5    Relevant Medications    atorvastatin (Lipitor) 40 mg tablet    Other Relevant Orders    Comprehensive Metabolic Panel    Type 2 diabetes mellitus without complication, without long-term current use of insulin (CMS/[facility]) - Primary E11.9    Relevant Medications    atorvastatin (Lipitor) 40 mg tablet    Other Relevant Orders    Comprehensive Metabolic Panel    HTN (hypertension) (Chronic) I10    Relevant Medications    lisinopriL-hydrochlorothiazide 20-25 mg tablet    Menopause syndrome N95.1    Relevant Medications    venlafaxine XR (Effexor-XR) 75 mg 24 hr capsule    Diabetes mellitus type 2 with neurological manifestations (CMS/[facility]) E11.49    PVD (peripheral vascular disease) (CMS/[facility]) I73.9    Obesity, morbid (CMS/[facility]) E66.01     Other Visit Diagnoses         Codes    Osteoporosis, unspecified osteoporosis type, unspecified pathological fracture presence     M81.0    Thoracic  spine pain     M54.6    Encounter for screening mammogram for malignant neoplasm of breast     Z12.31    Relevant Orders    BI mammo bilateral screening tomosynthesis    Drooling from right side of mouth     K11.7        Reviewed blood work, DEXA and endocrinology consult.  Multiple questions answered.  Discussed stretching for likely rib sprain. Declines bisphosphonates    Time Spent  Prep time on day of patient encounter: 2 minutes  Time spent directly with patient, family or caregiver: 35 minutes  Additional Time Spent on Patient Care Activities: 0 minutes  Documentation Time: 5 minutes  Other Time Spent: 0 minutes  Total: 42 minutes

## 2023-12-07 DIAGNOSIS — Z12.31 ENCOUNTER FOR SCREENING MAMMOGRAM FOR MALIGNANT NEOPLASM OF BREAST: Primary | ICD-10-CM

## 2023-12-11 ENCOUNTER — TELEPHONE (OUTPATIENT)
Dept: PRIMARY CARE | Facility: CLINIC | Age: 68
End: 2023-12-11
Payer: MEDICARE

## 2023-12-12 DIAGNOSIS — M54.6 THORACIC SPINE PAIN: Primary | ICD-10-CM

## 2023-12-15 DIAGNOSIS — E11.9 TYPE 2 DIABETES MELLITUS WITHOUT COMPLICATION, WITHOUT LONG-TERM CURRENT USE OF INSULIN (MULTI): Primary | ICD-10-CM

## 2023-12-15 RX ORDER — GLIMEPIRIDE 2 MG/1
2 TABLET ORAL 2 TIMES DAILY
Qty: 180 TABLET | Refills: 1 | Status: SHIPPED | OUTPATIENT
Start: 2023-12-15 | End: 2024-01-08 | Stop reason: SDUPTHER

## 2023-12-15 RX ORDER — PIOGLITAZONEHYDROCHLORIDE 45 MG/1
45 TABLET ORAL DAILY
Qty: 90 TABLET | Refills: 1 | Status: SHIPPED | OUTPATIENT
Start: 2023-12-15 | End: 2024-01-08 | Stop reason: SDUPTHER

## 2024-01-03 PROBLEM — Z87.2 PERSONAL HISTORY OF DISEASES OF THE SKIN AND SUBCUTANEOUS TISSUE: Status: ACTIVE | Noted: 2024-01-03

## 2024-01-03 PROBLEM — I34.1 MVP (MITRAL VALVE PROLAPSE): Status: ACTIVE | Noted: 2024-01-03

## 2024-01-03 RX ORDER — ONDANSETRON 4 MG/1
TABLET, ORALLY DISINTEGRATING ORAL
COMMUNITY
Start: 2023-04-27 | End: 2024-01-09 | Stop reason: WASHOUT

## 2024-01-03 RX ORDER — FLUCONAZOLE 150 MG/1
TABLET ORAL
COMMUNITY
Start: 2023-08-29 | End: 2024-01-09 | Stop reason: WASHOUT

## 2024-01-03 RX ORDER — FAMOTIDINE 40 MG/1
40 TABLET, FILM COATED ORAL NIGHTLY
COMMUNITY
Start: 2023-04-11 | End: 2024-01-09 | Stop reason: WASHOUT

## 2024-01-04 ENCOUNTER — APPOINTMENT (OUTPATIENT)
Dept: PHYSICAL THERAPY | Facility: CLINIC | Age: 69
End: 2024-01-04
Payer: MEDICARE

## 2024-01-08 ENCOUNTER — OFFICE VISIT (OUTPATIENT)
Dept: ENDOCRINOLOGY | Facility: CLINIC | Age: 69
End: 2024-01-08
Payer: MEDICARE

## 2024-01-08 VITALS
BODY MASS INDEX: 39.55 KG/M2 | WEIGHT: 237.4 LBS | HEIGHT: 65 IN | SYSTOLIC BLOOD PRESSURE: 134 MMHG | DIASTOLIC BLOOD PRESSURE: 72 MMHG | HEART RATE: 87 BPM

## 2024-01-08 DIAGNOSIS — E11.9 TYPE 2 DIABETES MELLITUS WITHOUT COMPLICATION, WITHOUT LONG-TERM CURRENT USE OF INSULIN (MULTI): Primary | ICD-10-CM

## 2024-01-08 LAB — POC HEMOGLOBIN A1C: 7.2 % (ref 4.2–6.5)

## 2024-01-08 PROCEDURE — 3075F SYST BP GE 130 - 139MM HG: CPT | Performed by: INTERNAL MEDICINE

## 2024-01-08 PROCEDURE — 1160F RVW MEDS BY RX/DR IN RCRD: CPT | Performed by: INTERNAL MEDICINE

## 2024-01-08 PROCEDURE — 1159F MED LIST DOCD IN RCRD: CPT | Performed by: INTERNAL MEDICINE

## 2024-01-08 PROCEDURE — 1036F TOBACCO NON-USER: CPT | Performed by: INTERNAL MEDICINE

## 2024-01-08 PROCEDURE — 99214 OFFICE O/P EST MOD 30 MIN: CPT | Performed by: INTERNAL MEDICINE

## 2024-01-08 PROCEDURE — 83036 HEMOGLOBIN GLYCOSYLATED A1C: CPT | Performed by: INTERNAL MEDICINE

## 2024-01-08 PROCEDURE — 3078F DIAST BP <80 MM HG: CPT | Performed by: INTERNAL MEDICINE

## 2024-01-08 RX ORDER — METFORMIN HYDROCHLORIDE 500 MG/1
2000 TABLET, EXTENDED RELEASE ORAL DAILY
Qty: 360 TABLET | Refills: 1 | Status: SHIPPED | OUTPATIENT
Start: 2024-01-08 | End: 2024-07-06

## 2024-01-08 RX ORDER — PIOGLITAZONEHYDROCHLORIDE 45 MG/1
45 TABLET ORAL DAILY
Qty: 90 TABLET | Refills: 1 | Status: SHIPPED | OUTPATIENT
Start: 2024-01-08 | End: 2024-07-06

## 2024-01-08 RX ORDER — BLOOD SUGAR DIAGNOSTIC
STRIP MISCELLANEOUS
Qty: 20 STRIP | Refills: 3 | Status: SHIPPED | OUTPATIENT
Start: 2024-01-08

## 2024-01-08 RX ORDER — GLIMEPIRIDE 2 MG/1
2 TABLET ORAL 2 TIMES DAILY
Qty: 180 TABLET | Refills: 1 | Status: SHIPPED | OUTPATIENT
Start: 2024-01-08 | End: 2024-03-18 | Stop reason: SDUPTHER

## 2024-01-08 RX ORDER — WITCH HAZEL 50 %
2000 PADS, MEDICATED (EA) TOPICAL DAILY
COMMUNITY

## 2024-01-08 RX ORDER — FERROUS SULFATE 325(65) MG
325 TABLET ORAL 3 TIMES WEEKLY
COMMUNITY

## 2024-01-08 NOTE — PATIENT INSTRUCTIONS
Thank you for choosing [facility]  for your health care needs.  If you have any questions, concerns or medical needs, please feel free to contact our office at [phone].    Please ensure you complete your blood work one week before the next scheduled appointment.    To continue Glimeperide 2mg twice daily before meals   To continue Piogiltazone 45 mg once daily   To continue Januvia 100mg once daily   To continue Metformin 2 grams daily   Counseled that the goal A1C should be 7% or less  Counseled glycemic control is warranted to prevent microvascular complications  Please take vitamin D 2000 units daily for bone health   To check to see Mounjaro or Ozempic is covered by your insurance   CGM likely not covered given lack of insulin use   For follow up in 4-5 months

## 2024-01-08 NOTE — PROGRESS NOTES
"Jose Juan Eric is a 68 y.o. female who presents for follow-up of Type 2 diabetes mellitus.     She was found to have osteoporosis via bone density scan obtained by PCP.     Vaginal yeast infections have resolved with the cessation of Jardiance therapy.      Known complications due to diabetes included none    Cardiovascular risk factors include advanced age (older than 55 for men, 65 for women) and obesity (BMI >= 30 kg/m2). The patient is on an ACE inhibitor or angiotensin II receptor blocker.  The patient has not been previously hospitalized due to diabetic ketoacidosis.     Current symptoms/problems include none. Her clinical course has been stable.     The patient is currently checking the blood glucose one time per day.    Patient is using: glucometer  GLUCOSE LOG DATA:  135-202mg/dL     Hypoglycemia frequency: Denies   Hypoglycemia awareness: N/A      Current Medication Regimen  Glimepiride 2mg twice daily --> not always prior to meals   Pioglitazone 45mg once daily   Metformin 2 grams daily   Januvia 100mg once daily      MEALS:   Breakfast - egg, bread  Lunch - omits   Dinner - vegetables, salad, spinach, beans, green beans, protein, seldom potatoes, rice   Beverages - orange juice      Exercise regimen -denies    Objective   /72 (BP Location: Right arm, Patient Position: Sitting, BP Cuff Size: Adult)   Pulse 87   Ht 1.651 m (5' 5\")   Wt 108 kg (237 lb 6.4 oz)   BMI 39.51 kg/m²   Physical Exam  Vitals and nursing note reviewed.   Constitutional:       General: She is not in acute distress.     Appearance: Normal appearance. She is obese.   HENT:      Head: Normocephalic and atraumatic.      Nose: Nose normal.      Mouth/Throat:      Mouth: Mucous membranes are moist.   Eyes:      Extraocular Movements: Extraocular movements intact.   Cardiovascular:      Rate and Rhythm: Normal rate and regular rhythm.   Pulmonary:      Effort: Pulmonary effort is normal.      Breath sounds: Normal " breath sounds.   Musculoskeletal:         General: Normal range of motion.   Skin:     General: Skin is warm.   Neurological:      Mental Status: She is alert and oriented to person, place, and time.   Psychiatric:         Mood and Affect: Mood normal.         Lab Review  Glucose (mg/dL)   Date Value   12/04/2023 144 (H)   08/22/2023 154 (H)   04/27/2023 278 (H)     POC HEMOGLOBIN A1c (%)   Date Value   01/08/2024 7.2 (A)     Hemoglobin A1C (%)   Date Value   08/22/2023 8.0 (A)   04/27/2023 8.8 (A)     Bicarbonate (mmol/L)   Date Value   12/04/2023 26   08/22/2023 26   04/27/2023 29     Urea Nitrogen (mg/dL)   Date Value   12/04/2023 13   08/22/2023 14   04/27/2023 17     Creatinine (mg/dL)   Date Value   12/04/2023 0.61   08/22/2023 0.86   04/27/2023 0.69     Lab Results   Component Value Date    CHOL 108 12/04/2023    CHOL 101 08/22/2023    CHOL 123 04/27/2023     Lab Results   Component Value Date    HDL 44.2 12/04/2023    HDL 39.8 (A) 08/22/2023    HDL 44.6 04/27/2023     Lab Results   Component Value Date    LDLCALC 46 12/04/2023     Lab Results   Component Value Date    TRIG 87 12/04/2023    TRIG 164 (H) 08/22/2023    TRIG 210 (H) 04/27/2023       Health Maintenance:   Foot Exam: August 28, 2023  Eye Exam:  Urine Albumin:    Assessment/Plan   68-year-old female presents for follow up for type 2 diabetes mellitus. Her last hemoglobin A1c has improved to 7.2% as of today.  Her blood pressure is at goal. She is noted to be on an ACE inhibitor. She is noted to be on a statin.     Type 2 diabetes mellitus without complication, without long-term current use of insulin (CMS/[facility])  To continue Glimeperide 2mg twice daily before meals   To continue Piogiltazone 45 mg once daily   To continue Januvia 100mg once daily   To continue Metformin 2 grams daily   Counseled that the goal A1C should be 7% or less  Congratulations on the improvement of A1C today   Counseled glycemic control is warranted to prevent microvascular  complications  To check to see Mounjaro or Ozempic is covered by your insurance given weight loss properties with these agents   CGM likely not covered given lack of insulin use       Osteoporosis  Please take vitamin D 2000 units daily for bone health   To follow up with PCP regarding this     For follow up in 4-5 months

## 2024-01-09 ENCOUNTER — OFFICE VISIT (OUTPATIENT)
Dept: CARDIOLOGY | Facility: CLINIC | Age: 69
End: 2024-01-09
Payer: MEDICARE

## 2024-01-09 VITALS
WEIGHT: 237 LBS | DIASTOLIC BLOOD PRESSURE: 76 MMHG | HEIGHT: 65 IN | HEART RATE: 68 BPM | BODY MASS INDEX: 39.49 KG/M2 | SYSTOLIC BLOOD PRESSURE: 120 MMHG

## 2024-01-09 DIAGNOSIS — I34.1 MVP (MITRAL VALVE PROLAPSE): ICD-10-CM

## 2024-01-09 DIAGNOSIS — I10 PRIMARY HYPERTENSION: Primary | Chronic | ICD-10-CM

## 2024-01-09 DIAGNOSIS — E11.9 TYPE 2 DIABETES MELLITUS WITHOUT COMPLICATION, WITHOUT LONG-TERM CURRENT USE OF INSULIN (MULTI): ICD-10-CM

## 2024-01-09 DIAGNOSIS — E78.5 HYPERLIPIDEMIA, UNSPECIFIED HYPERLIPIDEMIA TYPE: ICD-10-CM

## 2024-01-09 PROCEDURE — 1160F RVW MEDS BY RX/DR IN RCRD: CPT | Performed by: NURSE PRACTITIONER

## 2024-01-09 PROCEDURE — 99213 OFFICE O/P EST LOW 20 MIN: CPT | Performed by: NURSE PRACTITIONER

## 2024-01-09 PROCEDURE — 3074F SYST BP LT 130 MM HG: CPT | Performed by: NURSE PRACTITIONER

## 2024-01-09 PROCEDURE — 3078F DIAST BP <80 MM HG: CPT | Performed by: NURSE PRACTITIONER

## 2024-01-09 PROCEDURE — 1036F TOBACCO NON-USER: CPT | Performed by: NURSE PRACTITIONER

## 2024-01-09 PROCEDURE — 1159F MED LIST DOCD IN RCRD: CPT | Performed by: NURSE PRACTITIONER

## 2024-01-09 RX ORDER — ICOSAPENT ETHYL 1 G/1
2 CAPSULE ORAL 2 TIMES DAILY
Qty: 360 CAPSULE | Refills: 3 | Status: SHIPPED | OUTPATIENT
Start: 2024-01-09 | End: 2024-05-29

## 2024-01-09 RX ORDER — ATORVASTATIN CALCIUM 40 MG/1
40 TABLET, FILM COATED ORAL DAILY
Qty: 90 TABLET | Refills: 3 | Status: SHIPPED | OUTPATIENT
Start: 2024-01-09

## 2024-01-09 NOTE — PATIENT INSTRUCTIONS
Continue on current meds  Heart healthy, low sodium diet  Mediterranean diet is recommended  Follow up with Dr Hoffmann in 6 months

## 2024-01-11 PROBLEM — M81.0 OSTEOPOROSIS: Status: ACTIVE | Noted: 2024-01-11

## 2024-01-11 NOTE — ASSESSMENT & PLAN NOTE
Please take vitamin D 2000 units daily for bone health   To follow up with PCP regarding this     For follow up in 4-5 months

## 2024-01-11 NOTE — ASSESSMENT & PLAN NOTE
To continue Glimeperide 2mg twice daily before meals   To continue Piogiltazone 45 mg once daily   To continue Januvia 100mg once daily   To continue Metformin 2 grams daily   Counseled that the goal A1C should be 7% or less  Congratulations on the improvement of A1C today   Counseled glycemic control is warranted to prevent microvascular complications  To check to see Mounjaro or Ozempic is covered by your insurance given weight loss properties with these agents   CGM likely not covered given lack of insulin use

## 2024-02-13 ENCOUNTER — TELEPHONE (OUTPATIENT)
Dept: ENDOCRINOLOGY | Facility: CLINIC | Age: 69
End: 2024-02-13

## 2024-02-13 NOTE — TELEPHONE ENCOUNTER
Patient called to inform Dr. Cr that she will no longer be taking januvia due to cost.     It has been suggested that patient contact insurance to get a list of formulary alternatives. I've explained to patient once Dr. Cr has formulary list, she will maybe able to make a recommendation of another medication that is suitable in place of januvia. Jardiance is listened as intolerance.

## 2024-03-18 DIAGNOSIS — E11.9 TYPE 2 DIABETES MELLITUS WITHOUT COMPLICATION, WITHOUT LONG-TERM CURRENT USE OF INSULIN (MULTI): ICD-10-CM

## 2024-03-18 RX ORDER — GLIMEPIRIDE 4 MG/1
4 TABLET ORAL 2 TIMES DAILY
Qty: 180 TABLET | Refills: 1 | Status: SHIPPED | OUTPATIENT
Start: 2024-03-18 | End: 2024-09-14

## 2024-05-01 NOTE — PATIENT INSTRUCTIONS
Thank you for choosing [facility]  for your health care needs.  If you have any questions, concerns or medical needs, please feel free to contact our office at [phone].    Please ensure you complete your blood work one week before the next scheduled appointment.    To continue Glimeperide 4mg twice daily before meals   To continue Piogiltazone 45 mg once daily   To restart Januvia 100mg once daily   To continue Metformin 2 grams daily   To commence Fish Oil 2 grams twice daily as an alternative for Vascepa   Counseled that the goal A1C should be 7% or less  Counseled glycemic control is warranted to prevent microvascular complications  Please take vitamin D 2000 units daily for bone health   To check to see Mounjaro or Ozempic is covered by your insurance   To obtain blood tests today   Medicare will not cover CGM   For follow up in 4-5 months

## 2024-05-01 NOTE — PROGRESS NOTES
"Jose Juan Eric is a 68 y.o. female who presents for follow-up of Type 2 diabetes mellitus.     She just lost her sister and the  will be in two days.      Known complications due to diabetes included none    Cardiovascular risk factors include advanced age (older than 55 for men, 65 for women) and obesity (BMI >= 30 kg/m2). The patient is on an ACE inhibitor or angiotensin II receptor blocker.  The patient has not been previously hospitalized due to diabetic ketoacidosis.     Current symptoms/problems include none. Her clinical course has been stable.     The patient is currently checking the blood glucose one time per day.    Patient is using: glucometer                Hypoglycemia frequency: Denies   Hypoglycemia awareness: N/A      Current Medication Regimen  Glimepiride 4mg twice daily   Pioglitazone 45mg once daily   Metformin 2 grams daily   Januvia 100mg once daily --> stopped taking it in the last month     She had to stop Vascepa      MEALS:  fast foot options/hospital items   Breakfast - egg, bread  Lunch - omits   Dinner - vegetables, salad, spinach, beans, green beans, protein, seldom potatoes, rice   Beverages - orange juice      Exercise regimen -denies    Review of Systems   HENT:  Positive for tinnitus.    Musculoskeletal:  Positive for neck pain.   All other systems reviewed and are negative.    Objective   Visit Vitals  /66 (BP Location: Right arm, Patient Position: Sitting, BP Cuff Size: Adult)   Pulse 69   Ht 1.651 m (5' 5\")   Wt 102 kg (224 lb)   LMP  (LMP Unknown)   BMI 37.28 kg/m²   OB Status Postmenopausal   Smoking Status Never   BSA 2.16 m²       Physical Exam  Vitals and nursing note reviewed.   Constitutional:       General: She is not in acute distress.     Appearance: Normal appearance. She is obese.   HENT:      Head: Normocephalic and atraumatic.      Nose: Nose normal.      Mouth/Throat:      Mouth: Mucous membranes are moist.   Eyes:      Extraocular " Movements: Extraocular movements intact.   Cardiovascular:      Rate and Rhythm: Normal rate and regular rhythm.   Pulmonary:      Effort: Pulmonary effort is normal.      Breath sounds: Normal breath sounds.   Musculoskeletal:         General: Normal range of motion.      Right foot: Normal range of motion. No deformity.      Left foot: Normal range of motion. No deformity.   Feet:      Right foot:      Skin integrity: Skin integrity normal. No ulcer or blister.      Toenail Condition: Right toenails are normal.      Left foot:      Skin integrity: Skin integrity normal. No ulcer or blister.      Toenail Condition: Left toenails are normal.   Skin:     General: Skin is warm.   Neurological:      Mental Status: She is alert and oriented to person, place, and time.   Psychiatric:         Mood and Affect: Mood normal.         Lab Review  Glucose (mg/dL)   Date Value   12/04/2023 144 (H)   08/22/2023 154 (H)   04/27/2023 278 (H)     POC HEMOGLOBIN A1c (%)   Date Value   01/08/2024 7.2 (A)     Hemoglobin A1C (%)   Date Value   08/22/2023 8.0 (A)   04/27/2023 8.8 (A)     Bicarbonate (mmol/L)   Date Value   12/04/2023 26   08/22/2023 26   04/27/2023 29     Urea Nitrogen (mg/dL)   Date Value   12/04/2023 13   08/22/2023 14   04/27/2023 17     Creatinine (mg/dL)   Date Value   12/04/2023 0.61   08/22/2023 0.86   04/27/2023 0.69     Lab Results   Component Value Date    CHOL 108 12/04/2023    CHOL 101 08/22/2023    CHOL 123 04/27/2023     Lab Results   Component Value Date    HDL 44.2 12/04/2023    HDL 39.8 (A) 08/22/2023    HDL 44.6 04/27/2023     Lab Results   Component Value Date    LDLCALC 46 12/04/2023     Lab Results   Component Value Date    TRIG 87 12/04/2023    TRIG 164 (H) 08/22/2023    TRIG 210 (H) 04/27/2023       Health Maintenance:   Foot Exam: May 2, 2024  Eye Exam: October 17, 2023  Urine Albumin:    Assessment/Plan   68-year-old female presents for follow up for type 2 diabetes mellitus. Her blood pressure  is at goal. She is noted to be on an ACE inhibitor. She is noted to be on a statin.     Type 2 diabetes mellitus without complication, without long-term current use of insulin (Multi)  To continue Glimeperide 4mg twice daily before meals   To continue Piogiltazone 45 mg once daily   To restart Januvia 100mg once daily   To continue Metformin 2 grams daily   Counseled that the goal A1C should be 7% or less  Counseled glycemic control is warranted to prevent microvascular complications  To check to see Mounjaro or Ozempic is covered by your insurance   To obtain blood tests today   Medicare will not cover CGM given lack of insulin use       Hypertriglyceridemia  To commence Fish Oil 2 grams twice daily as an alternative for Vascepa   Counseled that the fasting triglyceride level should be less than 150        Osteoporosis  Please take vitamin D 2000 units daily for bone health   Management as per PCP     For follow up in 4-5 months

## 2024-05-02 ENCOUNTER — LAB (OUTPATIENT)
Dept: LAB | Facility: LAB | Age: 69
End: 2024-05-02
Payer: MEDICARE

## 2024-05-02 ENCOUNTER — APPOINTMENT (OUTPATIENT)
Dept: ENDOCRINOLOGY | Facility: CLINIC | Age: 69
End: 2024-05-02
Payer: MEDICARE

## 2024-05-02 ENCOUNTER — OFFICE VISIT (OUTPATIENT)
Dept: ENDOCRINOLOGY | Facility: CLINIC | Age: 69
End: 2024-05-02
Payer: MEDICARE

## 2024-05-02 VITALS
DIASTOLIC BLOOD PRESSURE: 66 MMHG | HEIGHT: 65 IN | HEART RATE: 69 BPM | BODY MASS INDEX: 37.32 KG/M2 | WEIGHT: 224 LBS | SYSTOLIC BLOOD PRESSURE: 128 MMHG

## 2024-05-02 DIAGNOSIS — E55.9 VITAMIN D DEFICIENCY: ICD-10-CM

## 2024-05-02 DIAGNOSIS — E11.9 TYPE 2 DIABETES MELLITUS WITHOUT COMPLICATION, WITHOUT LONG-TERM CURRENT USE OF INSULIN (MULTI): ICD-10-CM

## 2024-05-02 DIAGNOSIS — M81.0 OSTEOPOROSIS, UNSPECIFIED OSTEOPOROSIS TYPE, UNSPECIFIED PATHOLOGICAL FRACTURE PRESENCE: ICD-10-CM

## 2024-05-02 DIAGNOSIS — E11.9 TYPE 2 DIABETES MELLITUS WITHOUT COMPLICATION, WITHOUT LONG-TERM CURRENT USE OF INSULIN (MULTI): Primary | ICD-10-CM

## 2024-05-02 LAB
25(OH)D3 SERPL-MCNC: 29 NG/ML (ref 30–100)
ANION GAP SERPL CALC-SCNC: 10 MMOL/L (ref 10–20)
BUN SERPL-MCNC: 13 MG/DL (ref 6–23)
CALCIUM SERPL-MCNC: 9.5 MG/DL (ref 8.6–10.3)
CHLORIDE SERPL-SCNC: 99 MMOL/L (ref 98–107)
CO2 SERPL-SCNC: 31 MMOL/L (ref 21–32)
CREAT SERPL-MCNC: 0.75 MG/DL (ref 0.5–1.05)
EGFRCR SERPLBLD CKD-EPI 2021: 88 ML/MIN/1.73M*2
GLUCOSE SERPL-MCNC: 231 MG/DL (ref 74–99)
POTASSIUM SERPL-SCNC: 4.7 MMOL/L (ref 3.5–5.3)
SODIUM SERPL-SCNC: 135 MMOL/L (ref 136–145)

## 2024-05-02 PROCEDURE — 82306 VITAMIN D 25 HYDROXY: CPT

## 2024-05-02 PROCEDURE — 3074F SYST BP LT 130 MM HG: CPT | Performed by: INTERNAL MEDICINE

## 2024-05-02 PROCEDURE — 83036 HEMOGLOBIN GLYCOSYLATED A1C: CPT

## 2024-05-02 PROCEDURE — 1159F MED LIST DOCD IN RCRD: CPT | Performed by: INTERNAL MEDICINE

## 2024-05-02 PROCEDURE — 36415 COLL VENOUS BLD VENIPUNCTURE: CPT

## 2024-05-02 PROCEDURE — 80048 BASIC METABOLIC PNL TOTAL CA: CPT

## 2024-05-02 PROCEDURE — 99214 OFFICE O/P EST MOD 30 MIN: CPT | Performed by: INTERNAL MEDICINE

## 2024-05-02 PROCEDURE — 3052F HG A1C>EQUAL 8.0%<EQUAL 9.0%: CPT | Performed by: INTERNAL MEDICINE

## 2024-05-02 PROCEDURE — 1160F RVW MEDS BY RX/DR IN RCRD: CPT | Performed by: INTERNAL MEDICINE

## 2024-05-02 PROCEDURE — 3078F DIAST BP <80 MM HG: CPT | Performed by: INTERNAL MEDICINE

## 2024-05-02 ASSESSMENT — ENCOUNTER SYMPTOMS: NECK PAIN: 1

## 2024-05-03 LAB
EST. AVERAGE GLUCOSE BLD GHB EST-MCNC: 206 MG/DL
HBA1C MFR BLD: 8.8 %

## 2024-05-03 NOTE — RESULT ENCOUNTER NOTE
Labs have been reviewed.  The A1C was found to be 8.8%.  The kidney and electrolyte values are normal.  The Vitamin D level is slightly low at 29.  Please increase Glimepiride to 4mg twice daily and see if Mounjaro is covered.

## 2024-05-05 PROBLEM — E78.1 HYPERTRIGLYCERIDEMIA: Status: ACTIVE | Noted: 2023-04-27

## 2024-05-05 NOTE — ASSESSMENT & PLAN NOTE
To commence Fish Oil 2 grams twice daily as an alternative for Vascepa   Counseled that the fasting triglyceride level should be less than 150

## 2024-05-05 NOTE — ASSESSMENT & PLAN NOTE
Please take vitamin D 2000 units daily for bone health   Management as per PCP     For follow up in 4-5 months

## 2024-05-05 NOTE — ASSESSMENT & PLAN NOTE
To continue Glimeperide 4mg twice daily before meals   To continue Piogiltazone 45 mg once daily   To restart Januvia 100mg once daily   To continue Metformin 2 grams daily   Counseled that the goal A1C should be 7% or less  Counseled glycemic control is warranted to prevent microvascular complications  To check to see Mounjaro or Ozempic is covered by your insurance   To obtain blood tests today   Medicare will not cover CGM given lack of insulin use

## 2024-05-06 ENCOUNTER — TELEPHONE (OUTPATIENT)
Dept: ENDOCRINOLOGY | Facility: CLINIC | Age: 69
End: 2024-05-06
Payer: MEDICARE

## 2024-05-06 DIAGNOSIS — E11.9 TYPE 2 DIABETES MELLITUS WITHOUT COMPLICATION, WITHOUT LONG-TERM CURRENT USE OF INSULIN (MULTI): Primary | ICD-10-CM

## 2024-05-06 DIAGNOSIS — I10 HYPERTENSION, UNSPECIFIED TYPE: Chronic | ICD-10-CM

## 2024-05-06 RX ORDER — LISINOPRIL AND HYDROCHLOROTHIAZIDE 20; 25 MG/1; MG/1
1 TABLET ORAL DAILY
Qty: 90 TABLET | Refills: 0 | Status: SHIPPED | OUTPATIENT
Start: 2024-05-06 | End: 2024-05-29 | Stop reason: SDUPTHER

## 2024-05-06 NOTE — TELEPHONE ENCOUNTER
Pt called requesting pended med    Next OV 5/29  Pt lost printed rx  Pt OUT  Pt uses [facility]

## 2024-05-06 NOTE — TELEPHONE ENCOUNTER
----- Message from Chelsy Cr MD sent at 5/3/2024  4:35 PM EDT -----  Labs have been reviewed.  The A1C was found to be 8.8%.  The kidney and electrolyte values are normal.  The Vitamin D level is slightly low at 29.  Please increase Glimepiride to 4mg twice daily and see if Mounjaro is covered.

## 2024-05-07 ENCOUNTER — LAB (OUTPATIENT)
Dept: LAB | Facility: LAB | Age: 69
End: 2024-05-07
Payer: MEDICARE

## 2024-05-07 NOTE — TELEPHONE ENCOUNTER
Patient stopped at window to request new rx for lyudmila. If approved please send to [facility].     Labs relayed to patient; verbalized understanding.

## 2024-05-15 RX ORDER — BLOOD-GLUCOSE SENSOR
1 EACH MISCELLANEOUS CONTINUOUS
Qty: 2 EACH | Refills: 11 | Status: SHIPPED | OUTPATIENT
Start: 2024-05-15 | End: 2025-05-15

## 2024-05-15 RX ORDER — BLOOD-GLUCOSE,RECEIVER,CONT
EACH MISCELLANEOUS
Qty: 1 EACH | Refills: 0 | Status: SHIPPED | OUTPATIENT
Start: 2024-05-15

## 2024-05-29 ENCOUNTER — OFFICE VISIT (OUTPATIENT)
Dept: PRIMARY CARE | Facility: CLINIC | Age: 69
End: 2024-05-29
Payer: MEDICARE

## 2024-05-29 VITALS
BODY MASS INDEX: 37.77 KG/M2 | TEMPERATURE: 97.3 F | DIASTOLIC BLOOD PRESSURE: 82 MMHG | HEART RATE: 68 BPM | WEIGHT: 227 LBS | SYSTOLIC BLOOD PRESSURE: 124 MMHG | OXYGEN SATURATION: 98 %

## 2024-05-29 DIAGNOSIS — E11.9 TYPE 2 DIABETES MELLITUS WITHOUT COMPLICATION, WITHOUT LONG-TERM CURRENT USE OF INSULIN (MULTI): Primary | ICD-10-CM

## 2024-05-29 DIAGNOSIS — M81.0 OSTEOPOROSIS, UNSPECIFIED OSTEOPOROSIS TYPE, UNSPECIFIED PATHOLOGICAL FRACTURE PRESENCE: ICD-10-CM

## 2024-05-29 DIAGNOSIS — M54.2 NECK PAIN: ICD-10-CM

## 2024-05-29 DIAGNOSIS — M25.511 RIGHT SHOULDER PAIN, UNSPECIFIED CHRONICITY: ICD-10-CM

## 2024-05-29 DIAGNOSIS — I10 HYPERTENSION, UNSPECIFIED TYPE: Chronic | ICD-10-CM

## 2024-05-29 DIAGNOSIS — E11.49 DIABETES MELLITUS TYPE 2 WITH NEUROLOGICAL MANIFESTATIONS (MULTI): ICD-10-CM

## 2024-05-29 DIAGNOSIS — N95.1 MENOPAUSE SYNDROME: ICD-10-CM

## 2024-05-29 DIAGNOSIS — E78.1 HYPERTRIGLYCERIDEMIA: ICD-10-CM

## 2024-05-29 PROCEDURE — 3079F DIAST BP 80-89 MM HG: CPT | Performed by: FAMILY MEDICINE

## 2024-05-29 PROCEDURE — 1160F RVW MEDS BY RX/DR IN RCRD: CPT | Performed by: FAMILY MEDICINE

## 2024-05-29 PROCEDURE — 1159F MED LIST DOCD IN RCRD: CPT | Performed by: FAMILY MEDICINE

## 2024-05-29 PROCEDURE — 3052F HG A1C>EQUAL 8.0%<EQUAL 9.0%: CPT | Performed by: FAMILY MEDICINE

## 2024-05-29 PROCEDURE — 1036F TOBACCO NON-USER: CPT | Performed by: FAMILY MEDICINE

## 2024-05-29 PROCEDURE — 3074F SYST BP LT 130 MM HG: CPT | Performed by: FAMILY MEDICINE

## 2024-05-29 PROCEDURE — 99215 OFFICE O/P EST HI 40 MIN: CPT | Performed by: FAMILY MEDICINE

## 2024-05-29 RX ORDER — LISINOPRIL AND HYDROCHLOROTHIAZIDE 20; 25 MG/1; MG/1
1 TABLET ORAL DAILY
Qty: 90 TABLET | Refills: 1 | Status: SHIPPED | OUTPATIENT
Start: 2024-05-29 | End: 2024-11-25

## 2024-05-29 RX ORDER — VENLAFAXINE HYDROCHLORIDE 75 MG/1
75 CAPSULE, EXTENDED RELEASE ORAL DAILY
Qty: 90 CAPSULE | Refills: 1 | Status: SHIPPED | OUTPATIENT
Start: 2024-05-29 | End: 2024-11-25

## 2024-05-29 RX ORDER — SITAGLIPTIN 100 MG/1
100 TABLET, FILM COATED ORAL DAILY
COMMUNITY
Start: 2024-05-06

## 2024-05-29 ASSESSMENT — ENCOUNTER SYMPTOMS
ABDOMINAL PAIN: 0
POLYPHAGIA: 0
SHORTNESS OF BREATH: 0
DIFFICULTY URINATING: 0
DYSPHORIC MOOD: 0
NAUSEA: 0
DIZZINESS: 0
DYSURIA: 0
POLYDIPSIA: 0
BLOOD IN STOOL: 0
MYALGIAS: 0
FATIGUE: 0
DIARRHEA: 0
HEADACHES: 0
HYPERTENSION: 1
PALPITATIONS: 0
CONSTIPATION: 0
VOMITING: 0
SLEEP DISTURBANCE: 0

## 2024-05-29 NOTE — PROGRESS NOTES
Subjective   Patient ID: Kaity Eric is a 68 y.o. female who presents for Hypertension and Diabetes (Review BW) and multiple issues    Hypertension  Pertinent negatives include no chest pain, headaches, palpitations or shortness of breath.   Diabetes  Pertinent negatives for hypoglycemia include no dizziness or headaches. Pertinent negatives for diabetes include no chest pain, no fatigue, no polydipsia, no polyphagia and no polyuria.      Neck/shoulder pain: persistent.  Doing a lot of moving since sister's passing.  Pain flares with any lifting.  As needed meds minimally helping.  Would like to trial PT  Vit D: low 29.  Inconsistently taking vitamin D  DM: Last A1c high.  Patient stopped Jardiance and Januvia due to cost.  Blood sugars have been running high.  Currently seeing endocrinology  Osteoporosis: Seen on last DEXA.  Declines Fosamax or bisphosphonates.  Hypertension: Stable and controlled  Menopause: Stable on SNRI.  Lipids: Stable but stopped Vascepa due to cost  \  Review of Systems   Constitutional:  Negative for fatigue.   HENT: Negative.     Eyes:  Negative for visual disturbance.   Respiratory:  Negative for shortness of breath.    Cardiovascular:  Negative for chest pain and palpitations.   Gastrointestinal:  Negative for abdominal pain, blood in stool, constipation, diarrhea, nausea and vomiting.   Endocrine: Negative for cold intolerance, heat intolerance, polydipsia, polyphagia and polyuria.   Genitourinary:  Negative for difficulty urinating and dysuria.   Musculoskeletal:  Negative for myalgias.   Skin:  Negative for rash.   Neurological:  Negative for dizziness and headaches.   Psychiatric/Behavioral:  Negative for dysphoric mood and sleep disturbance.        Objective   /82   Pulse 68   Temp 36.3 °C (97.3 °F)   Wt 103 kg (227 lb)   LMP  (LMP Unknown)   SpO2 98%   BMI 37.77 kg/m²     Physical Exam  Vitals and nursing note reviewed.   Constitutional:       General: She is not in  acute distress.     Appearance: Normal appearance. She is not toxic-appearing.   HENT:      Head: Normocephalic.   Eyes:      General: No scleral icterus.     Pupils: Pupils are equal, round, and reactive to light.   Neck:      Vascular: No carotid bruit.   Cardiovascular:      Rate and Rhythm: Normal rate and regular rhythm.      Heart sounds: No murmur heard.  Pulmonary:      Effort: Pulmonary effort is normal. No respiratory distress.      Breath sounds: Normal breath sounds.   Abdominal:      Palpations: Abdomen is soft.      Tenderness: There is no abdominal tenderness. There is no guarding.   Musculoskeletal:      Right lower leg: No edema.      Left lower leg: No edema.      Comments: C-spine nontender to palpation.  Decreased range of motion to right rotation.  Strength and sensation intact bilateral upper extremities.  T-spine nontender to palpation   Skin:     General: Skin is warm.   Neurological:      General: No focal deficit present.      Mental Status: She is alert.      Cranial Nerves: No cranial nerve deficit.   Psychiatric:         Mood and Affect: Mood normal.         Assessment/Plan   Problem List Items Addressed This Visit             ICD-10-CM    Hypertriglyceridemia E78.1    Relevant Orders    Referral to Clinical Pharmacy    Comprehensive Metabolic Panel    Lipid Panel    Type 2 diabetes mellitus without complication, without long-term current use of insulin (Multi) - Primary E11.9    Relevant Orders    Referral to Clinical Pharmacy    HTN (hypertension) (Chronic) I10    Relevant Medications    lisinopriL-hydrochlorothiazide 20-25 mg tablet    Other Relevant Orders    Comprehensive Metabolic Panel    Menopause syndrome N95.1    Relevant Medications    venlafaxine XR (Effexor-XR) 75 mg 24 hr capsule    Diabetes mellitus type 2 with neurological manifestations (Multi) E11.49    Osteoporosis M81.0     Other Visit Diagnoses         Codes    Neck pain     M54.2    Relevant Orders    Referral to  Physical Therapy    Right shoulder pain, unspecified chronicity     M25.511    Relevant Orders    Referral to Physical Therapy        Blood work, consults, prior imaging    Time Spent  Prep time on day of patient encounter: 1 minutes  Time spent directly with patient, family or caregiver: 40 minutes  Additional Time Spent on Patient Care Activities: 0 minutes  Documentation Time: 4 minutes  Other Time Spent: 0 minutes  Total: 45 minutes    Over half the time spent counseling patient

## 2024-05-29 NOTE — PATIENT INSTRUCTIONS
Recommend a predominant low fat whole foods plant based diet.  Cut back on meat, dairy, processed carbs, salt and oils(especially palm and coconut). Increase fiber in your diet.  Decrease alcohol as much as possible if you drink. Recommend regular exercise most days of the week(goal up to 150min per week). Also recommend good sleep habits aiming for 7-8 hours per night.     Continue your current meds    You were referred for cholesterol check    You were referred to PT    Follow up with your specialists as scheduled. You were referred to clinical pharmacy    Please bring in copies of your advance directives to your next appointment    Return in 6 months for recheck and wellness visit, sooner if needed

## 2024-05-30 ENCOUNTER — APPOINTMENT (OUTPATIENT)
Dept: ENDOCRINOLOGY | Facility: CLINIC | Age: 69
End: 2024-05-30
Payer: MEDICARE

## 2024-05-31 DIAGNOSIS — E11.9 TYPE 2 DIABETES MELLITUS WITHOUT COMPLICATION, WITHOUT LONG-TERM CURRENT USE OF INSULIN (MULTI): Primary | ICD-10-CM

## 2024-06-24 ENCOUNTER — SPECIALTY PHARMACY (OUTPATIENT)
Dept: PHARMACY | Facility: CLINIC | Age: 69
End: 2024-06-24

## 2024-06-24 ENCOUNTER — APPOINTMENT (OUTPATIENT)
Dept: PHARMACY | Facility: HOSPITAL | Age: 69
End: 2024-06-24
Payer: MEDICARE

## 2024-06-24 DIAGNOSIS — E11.9 TYPE 2 DIABETES MELLITUS WITHOUT COMPLICATION, WITHOUT LONG-TERM CURRENT USE OF INSULIN (MULTI): ICD-10-CM

## 2024-06-24 RX ORDER — TIRZEPATIDE 2.5 MG/.5ML
2.5 INJECTION, SOLUTION SUBCUTANEOUS
Qty: 2 ML | Refills: 0 | Status: SHIPPED | OUTPATIENT
Start: 2024-06-30

## 2024-06-24 NOTE — PROGRESS NOTES
Patient ID: Kaity Eric is a 68 y.o. female who presents for Diabetes.    Referring Provider: Mei Taylor DO  PCP: Mei Taylor DO   Last visit with PCP: 5/29/2024 Next visit with PCP: 12/3/2024  Endocrinologist: Dr. sOman Cr (next visit 10/1/2024)    Subjective       HPI  Current  Pharmacotherapy:   Glimepiride 4 mg BID  Januvia 100 mg daily  Metformin XL 2000 mg daily  Pioglitazone 45 mg daily     SECONDARY PREVENTION  - Statin? Atorvastatin 40 mg   - ACE-I/ARB? Lisinopril 20 mg   - Aspirin? No    -The ASCVD Risk score (Clay DK, et al., 2019) failed to calculate for the following reasons:    The valid total cholesterol range is 130 to 320 mg/dL      Current monitoring regimen:   Patient is using:      SMBG Fasting Readings:     Pertinent PMH Review:  - PMH of Pancreatitis: no  - PMH/FH of Medullary Thyroid Cancer: no  - PMH of Retinopathy: no      Patient Assistance Screening (VAF)    Patient verbally reports monthly or yearly income which is less than 400% federal poverty level     Application for program has been submitted for the following medications: Mounjaro    Patient to FAX financial documents to this author.    Patient aware this process may take up to 6 weeks.     If approved medication must be filled through [facility] pharmacy and may be picked up or mailed to patient.       Review of Systems      Objective     LMP  (LMP Unknown)    BP Readings from Last 4 Encounters:   05/29/24 124/82   05/02/24 128/66   01/09/24 120/76   01/08/24 134/72      There were no vitals filed for this visit.     Labs  Lab Results   Component Value Date    BILITOT 0.4 12/04/2023    CALCIUM 9.5 05/02/2024    CO2 31 05/02/2024    CL 99 05/02/2024    CREATININE 0.75 05/02/2024    GLUCOSE 231 (H) 05/02/2024    ALKPHOS 58 12/04/2023    K 4.7 05/02/2024    PROT 5.9 (L) 12/04/2023     (L) 05/02/2024    AST 15 12/04/2023    ALT 12 12/04/2023    BUN 13 05/02/2024    ANIONGAP 10 05/02/2024     ALBUMIN 3.8 12/04/2023    GFRF 75 08/22/2023     Lab Results   Component Value Date    TRIG 87 12/04/2023    CHOL 108 12/04/2023    LDLCALC 46 12/04/2023    HDL 44.2 12/04/2023     Lab Results   Component Value Date    HGBA1C 8.8 (H) 05/02/2024       Current Outpatient Medications   Medication Instructions    atorvastatin (LIPITOR) 40 mg, oral, Daily    blood-glucose sensor (FreeStyle Victor M 3 Sensor) device 1 each, miscellaneous, Continuous, Use to check glucose, change every 14 days    calcium carbonate-vitamin D3 600 mg-25 mcg (1,000 unit) capsule 2 tablets, oral, Daily    cyanocobalamin (VITAMIN B-12) 2,000 mcg, oral, Daily    ferrous sulfate (325 mg ferrous sulfate) 325 mg, oral, 3 times weekly    FreeStyle Victor M 3 [place] misc Use as instructed    glimepiride (AMARYL) 4 mg, oral, 2 times daily    lisinopriL-hydrochlorothiazide 20-25 mg tablet 1 tablet, oral, Daily    metFORMIN XR (GLUCOPHAGE-XR) 2,000 mg, oral, Daily    [START ON 6/30/2024] Mounjaro 2.5 mg, subcutaneous, Once Weekly    OneTouch Ultra Test strip check fasting blood sugar once daily.    pioglitazone (ACTOS) 45 mg, oral, Daily    venlafaxine XR (EFFEXOR-XR) 75 mg, oral, Daily, Take with food.         Drug Interactions;  None at time of review    Assessment/Plan   Problem List Items Addressed This Visit       Type 2 diabetes mellitus without complication, without long-term current use of insulin (Multi)     Patient's diabetes is currently uncontrolled, as shown by most recent A1c of 8.8% (goal <7%).     Patient is interested in getting a CGM, but it is not covered under insurance due to lack of insulin use.     Today, we discuss Mounjaro therapy for patient for improved glucose control, weight loss and renal/cardiac protection. Mounjaro is covered without a PA and patient verbally confirms that she should qualify for  PAP. Will have her send me financial information. Education provided today on Mounjaro.     Plan:  START Mounjaro 2.5 mg  weekly  Counseled patient on MOA, expectations, side effects, duration of therapy, contraindications, administration techniques, and monitoring parameters  Answered all other questions and concerns  Rx to  [facility] for PAP         Relevant Medications    tirzepatide (Mounjaro) 2.5 mg/0.5 mL pen injector (Start on 6/30/2024)    Other Relevant Orders    Follow Up In Clinical Pharmacy       Follow-up: 7/1 @ 12:30 AM     Time spent with pt: Total length of time 27 (minutes) of the encounter and more than 50% was spent counseling the patient.    Tootie Zheng, PharmD    Continue all meds under the continuation of care with the referring provider and clinical pharmacy team.

## 2024-06-24 NOTE — ASSESSMENT & PLAN NOTE
Patient's diabetes is currently uncontrolled, as shown by most recent A1c of 8.8% (goal <7%).     Patient is interested in getting a CGM, but it is not covered under insurance due to lack of insulin use.     Today, we discuss Mounjaro therapy for patient for improved glucose control, weight loss and renal/cardiac protection. Mounjaro is covered without a PA and patient verbally confirms that she should qualify for  PAP. Will have her send me financial information. Education provided today on Mounjaro.     Plan:  START Mounjaro 2.5 mg weekly  Counseled patient on MOA, expectations, side effects, duration of therapy, contraindications, administration techniques, and monitoring parameters  Answered all other questions and concerns  Rx to  [facility] for PAP

## 2024-06-28 PROCEDURE — RXMED WILLOW AMBULATORY MEDICATION CHARGE

## 2024-06-29 ENCOUNTER — PHARMACY VISIT (OUTPATIENT)
Dept: PHARMACY | Facility: CLINIC | Age: 69
End: 2024-06-29
Payer: COMMERCIAL

## 2024-06-30 NOTE — PROGRESS NOTES
Patient ID: Kaity Eric is a 68 y.o. female who presents for Diabetes.    Referring Provider: Mei Taylor DO  PCP: Mei Taylor DO   Last visit with PCP: 5/29/2024 Next visit with PCP: 12/3/2024    Endocrinologist: Dr. Osman Cr (next visit 10/1/2024)    Subjective       Diabetes    Current  Pharmacotherapy:   Glimepiride 4 mg BID  Januvia 100 mg daily  Metformin XL 2000 mg daily  Pioglitazone 45 mg daily     SECONDARY PREVENTION  - Statin? Atorvastatin 40 mg   - ACE-I/ARB? Lisinopril 20 mg   - Aspirin? No    -The ASCVD Risk score (Clay DK, et al., 2019) failed to calculate for the following reasons:    The valid total cholesterol range is 130 to 320 mg/dL      Current monitoring regimen:   Patient is using:      SMBG Fasting Readings:     Pertinent PMH Review:  - PMH of Pancreatitis: no  - PMH/FH of Medullary Thyroid Cancer: no  - PMH of Retinopathy: no      VAF Application    Medication(s): Mounjaro    Application response: [Approved]    Approved until: 6/28/2025    Additional information:  - Patient will get this prescription mailed to them from  pharmacies to receive medication at no cost  - Patient will need to re-enroll in this program prior to expiration date to maintain coverage          Review of Systems      Objective     LMP  (LMP Unknown)    BP Readings from Last 4 Encounters:   05/29/24 124/82   05/02/24 128/66   01/09/24 120/76   01/08/24 134/72      There were no vitals filed for this visit.     Labs  Lab Results   Component Value Date    BILITOT 0.4 12/04/2023    CALCIUM 9.5 05/02/2024    CO2 31 05/02/2024    CL 99 05/02/2024    CREATININE 0.75 05/02/2024    GLUCOSE 231 (H) 05/02/2024    ALKPHOS 58 12/04/2023    K 4.7 05/02/2024    PROT 5.9 (L) 12/04/2023     (L) 05/02/2024    AST 15 12/04/2023    ALT 12 12/04/2023    BUN 13 05/02/2024    ANIONGAP 10 05/02/2024    ALBUMIN 3.8 12/04/2023    GFRF 75 08/22/2023     Lab Results   Component Value Date    TRIG 87  12/04/2023    CHOL 108 12/04/2023    LDLCALC 46 12/04/2023    HDL 44.2 12/04/2023     Lab Results   Component Value Date    HGBA1C 8.8 (H) 05/02/2024       Current Outpatient Medications   Medication Instructions    atorvastatin (LIPITOR) 40 mg, oral, Daily    blood-glucose sensor (FreeStyle Victor M 3 Sensor) device 1 each, miscellaneous, Continuous, Use to check glucose, change every 14 days    calcium carbonate-vitamin D3 600 mg-25 mcg (1,000 unit) capsule 2 tablets, oral, Daily    cyanocobalamin (VITAMIN B-12) 2,000 mcg, oral, Daily    ferrous sulfate (325 mg ferrous sulfate) 325 mg, oral, 3 times weekly    FreeStyle Victor M 3 [place] misc Use as instructed    glimepiride (AMARYL) 4 mg, oral, 2 times daily    lisinopriL-hydrochlorothiazide 20-25 mg tablet 1 tablet, oral, Daily    metFORMIN XR (GLUCOPHAGE-XR) 2,000 mg, oral, Daily    Mounjaro 2.5 mg, subcutaneous, Once Weekly    OneTouch Ultra Test strip check fasting blood sugar once daily.    pioglitazone (ACTOS) 45 mg, oral, Daily    venlafaxine XR (EFFEXOR-XR) 75 mg, oral, Daily, Take with food.         Drug Interactions;  None at time of review    Assessment/Plan   Problem List Items Addressed This Visit       Type 2 diabetes mellitus without complication, without long-term current use of insulin (Multi)     Patient's diabetes is currently uncontrolled, as shown by most recent A1c of 8.8% (goal <7%).     Patient assistance was approved today for patient's Mounjaro therapy. She plans to start in the next week. We discuss therapy at length and all patient's questions were answered. Discussed that she can stop the Januvia the day she begins the Mounjaro.     Plan:  START Mounjaro 2.5 mg weekly  Rx to [facility] for PAP  STOP Januvia 100 mg daily         Relevant Orders    Follow Up In Clinical Pharmacy       Follow-up: 7/22 @ 12:30 AM     Time spent with pt: Total length of time 27 (minutes) of the encounter and more than 50% was spent counseling the  patient.    Tootie Zheng, PharmD    Continue all meds under the continuation of care with the referring provider and clinical pharmacy team.

## 2024-07-01 ENCOUNTER — APPOINTMENT (OUTPATIENT)
Dept: PHARMACY | Facility: HOSPITAL | Age: 69
End: 2024-07-01
Payer: MEDICARE

## 2024-07-01 DIAGNOSIS — E11.9 TYPE 2 DIABETES MELLITUS WITHOUT COMPLICATION, WITHOUT LONG-TERM CURRENT USE OF INSULIN (MULTI): ICD-10-CM

## 2024-07-01 NOTE — ASSESSMENT & PLAN NOTE
Patient's diabetes is currently uncontrolled, as shown by most recent A1c of 8.8% (goal <7%).     Patient assistance was approved today for patient's Mounjaro therapy. She plans to start in the next week. We discuss therapy at length and all patient's questions were answered. Discussed that she can stop the Januvia the day she begins the Mounjaro.     Plan:  START Mounjaro 2.5 mg weekly  Rx to  [facility] for PAP  STOP Januvia 100 mg daily

## 2024-07-05 ENCOUNTER — TELEPHONE (OUTPATIENT)
Dept: CARDIOLOGY | Facility: CLINIC | Age: 69
End: 2024-07-05
Payer: MEDICARE

## 2024-07-05 NOTE — TELEPHONE ENCOUNTER
Called patient to see what med she has quit taking.  Looks like she had a cmp and lipid labs done 12/2023 and her PCP has ordered them for this year as well.  No answer and unable to leave a message as voicemail is full.       ----- Message from Geraldine Perez sent at 7/3/2024  9:23 AM EDT -----  Regarding: labs  She left a VM asking If she needs to have labs drawn before her appt with Dr. Hoffmann next Friday?   Says she has been off a medication for about 3-4 weeks.

## 2024-07-10 ENCOUNTER — APPOINTMENT (OUTPATIENT)
Dept: PHARMACY | Facility: HOSPITAL | Age: 69
End: 2024-07-10
Payer: MEDICARE

## 2024-07-12 ENCOUNTER — APPOINTMENT (OUTPATIENT)
Dept: CARDIOLOGY | Facility: CLINIC | Age: 69
End: 2024-07-12
Payer: MEDICARE

## 2024-07-12 VITALS
DIASTOLIC BLOOD PRESSURE: 74 MMHG | WEIGHT: 228 LBS | SYSTOLIC BLOOD PRESSURE: 136 MMHG | HEIGHT: 65 IN | HEART RATE: 71 BPM | BODY MASS INDEX: 37.99 KG/M2

## 2024-07-12 DIAGNOSIS — I10 HYPERTENSION, UNSPECIFIED TYPE: Chronic | ICD-10-CM

## 2024-07-12 DIAGNOSIS — E11.9 TYPE 2 DIABETES MELLITUS WITHOUT COMPLICATION, WITHOUT LONG-TERM CURRENT USE OF INSULIN (MULTI): ICD-10-CM

## 2024-07-12 DIAGNOSIS — I10 PRIMARY HYPERTENSION: Chronic | ICD-10-CM

## 2024-07-12 DIAGNOSIS — I34.1 MVP (MITRAL VALVE PROLAPSE): ICD-10-CM

## 2024-07-12 DIAGNOSIS — E78.5 HYPERLIPIDEMIA, UNSPECIFIED HYPERLIPIDEMIA TYPE: ICD-10-CM

## 2024-07-12 PROCEDURE — 3078F DIAST BP <80 MM HG: CPT | Performed by: INTERNAL MEDICINE

## 2024-07-12 PROCEDURE — 93000 ELECTROCARDIOGRAM COMPLETE: CPT | Performed by: INTERNAL MEDICINE

## 2024-07-12 PROCEDURE — 1159F MED LIST DOCD IN RCRD: CPT | Performed by: INTERNAL MEDICINE

## 2024-07-12 PROCEDURE — 3052F HG A1C>EQUAL 8.0%<EQUAL 9.0%: CPT | Performed by: INTERNAL MEDICINE

## 2024-07-12 PROCEDURE — 1036F TOBACCO NON-USER: CPT | Performed by: INTERNAL MEDICINE

## 2024-07-12 PROCEDURE — 99214 OFFICE O/P EST MOD 30 MIN: CPT | Performed by: INTERNAL MEDICINE

## 2024-07-12 PROCEDURE — 3075F SYST BP GE 130 - 139MM HG: CPT | Performed by: INTERNAL MEDICINE

## 2024-07-12 RX ORDER — ATORVASTATIN CALCIUM 40 MG/1
40 TABLET, FILM COATED ORAL DAILY
Qty: 90 TABLET | Refills: 3 | Status: SHIPPED | OUTPATIENT
Start: 2024-07-12 | End: 2025-07-12

## 2024-07-12 RX ORDER — LISINOPRIL AND HYDROCHLOROTHIAZIDE 20; 25 MG/1; MG/1
1 TABLET ORAL DAILY
Qty: 90 TABLET | Refills: 3 | Status: SHIPPED | OUTPATIENT
Start: 2024-07-12 | End: 2025-07-12

## 2024-07-12 NOTE — PROGRESS NOTES
"Chief Complaint:   No chief complaint on file.     History Of Present Illness:    Kaity Eric is a 68 y.o. female presenting for 6 month follow up   H/o prior mitral valve regurgitation in her 20's, HTN, DM, DL who presents for follow up.      Through pharmacy consult she was able to start Monjouro and had first injection on Monday.  Of note, vascepa is too expensive - pharmacist is trying to help patient with cost.  Doing well from CV perspective.  No chest pain/pressure, SOB, LE edema,orthopnea palps, syncope/presyncope.  Occ dizziness due to vestibular dysfunction.        CV testing and labs reviewed:   BMP 5/2024  K 4.7  BUN 13  crea 0.75  Lipid labs 12/2023  chol 108  HDL 44.2  LDL 46  trig 87  TTE 11/2020  EF 60%  no evidence of mitral valve prolapse    Prior history:  The patient reports a prior history of \"mitral valve regurgitation\" or MVP and has been taking antibiotics prior to dental procedures since her 20's. She has not had a recent evaluation with echocardiogram.     Kaity denies any current chest pain, SOB, LE edema, palpitations. She does have rare dizziness with what sounds like vertigo - her nephew is premed and has educated her on particular repositioning maneuvers, which helps.   She has lost 45-50 lbs in the last 3 years - intentional weight loss, by diet (has tried keto).  Of note, she does have anemia, and her Hb was 11.3 > and is now down to the 7's more recently.  She does walk regularly at work, but at a slow pace.    Last Recorded Vitals:  There were no vitals filed for this visit.    Past Medical History:  She has a past medical history of CN (constipation) (10/15/2008), Combined forms of age-related cataract of left eye (11/06/2020), Dry eye syndrome of both eyes (11/06/2020), History of phacoemulsification of cataract with intraocular lens implantation, right (01/08/2021), Personal history of diseases of the skin and subcutaneous tissue, and Personal history of diseases of the " skin and subcutaneous tissue.    Past Surgical History:  She has a past surgical history that includes Other surgical history (07/12/2019) and Other surgical history (10/13/2020).      Social History:  She reports that she has never smoked. She has never used smokeless tobacco. She reports that she does not currently use alcohol. She reports that she does not use drugs.    Family History:  Family History   Problem Relation Name Age of Onset    Coronary artery disease Mother      Heart attack Father      Uterine cancer Sister      Heart attack Brother      Melanoma Other          Allergies:  Jardiance [empagliflozin], Latex, and Red dye    Outpatient Medications:  Current Outpatient Medications   Medication Instructions    atorvastatin (LIPITOR) 40 mg, oral, Daily    blood-glucose sensor (FreeStyle Victor M 3 Sensor) device 1 each, miscellaneous, Continuous, Use to check glucose, change every 14 days    calcium carbonate-vitamin D3 600 mg-25 mcg (1,000 unit) capsule 2 tablets, oral, Daily    cyanocobalamin (VITAMIN B-12) 2,000 mcg, oral, Daily    ferrous sulfate (325 mg ferrous sulfate) 325 mg, oral, 3 times weekly    FreeStyle Victor M 3 [place] misc Use as instructed    glimepiride (AMARYL) 4 mg, oral, 2 times daily    lisinopriL-hydrochlorothiazide 20-25 mg tablet 1 tablet, oral, Daily    metFORMIN XR (GLUCOPHAGE-XR) 2,000 mg, oral, Daily    Mounjaro 2.5 mg, subcutaneous, Once Weekly    OneTouch Ultra Test strip check fasting blood sugar once daily.    pioglitazone (ACTOS) 45 mg, oral, Daily    venlafaxine XR (EFFEXOR-XR) 75 mg, oral, Daily, Take with food.       Physical Exam:  Physical Exam  HENT:      Head: Normocephalic.      Nose: Nose normal.      Mouth/Throat:      Mouth: Mucous membranes are moist.   Cardiovascular:      Rate and Rhythm: Normal rate and regular rhythm.      Comments: No carotid bruits  No significant murmurs  Pulmonary:      Effort: Pulmonary effort is normal.      Breath sounds: Normal breath  sounds.      Comments: Mild wheezing  Abdominal:      Palpations: Abdomen is soft.   Musculoskeletal:         General: Normal range of motion.      Cervical back: Normal range of motion.   Skin:     General: Skin is warm and dry.   Neurological:      General: No focal deficit present.      Mental Status: She is alert.   Psychiatric:         Mood and Affect: Mood normal.            Last Labs:  CBC -  Lab Results   Component Value Date    WBC 8.4 04/27/2023    HGB 14.6 04/27/2023    HCT 44.2 04/27/2023    MCV 94 04/27/2023     04/27/2023       CMP -  Lab Results   Component Value Date    CALCIUM 9.5 05/02/2024    PROT 5.9 (L) 12/04/2023    ALBUMIN 3.8 12/04/2023    AST 15 12/04/2023    ALT 12 12/04/2023    ALKPHOS 58 12/04/2023    BILITOT 0.4 12/04/2023       LIPID PANEL -   Lab Results   Component Value Date    CHOL 108 12/04/2023    TRIG 87 12/04/2023    HDL 44.2 12/04/2023    CHHDL 2.4 12/04/2023    LDLF 28 08/22/2023    VLDL 17 12/04/2023    NHDL 64 12/04/2023       RENAL FUNCTION PANEL -   Lab Results   Component Value Date    GLUCOSE 231 (H) 05/02/2024     (L) 05/02/2024    K 4.7 05/02/2024    CL 99 05/02/2024    CO2 31 05/02/2024    ANIONGAP 10 05/02/2024    BUN 13 05/02/2024    CREATININE 0.75 05/02/2024    CALCIUM 9.5 05/02/2024    ALBUMIN 3.8 12/04/2023        Lab Results   Component Value Date    HGBA1C 8.8 (H) 05/02/2024    HGBA1C 7.2 (A) 01/08/2024           Assessment/Plan   Hypertension: Blood pressure today 136/74 - did not take her meds today yet.  She will continue on lisinopril/hydrochlorothiazide 20/25 mg daily.  I recommended she reduce the sodium content in her diet.     Hyperlipidemia: Patient has a history of hyperlipidemia.  At her last office visit she was continued on atorvastatin.  Unable to afford vascepa unfortunately.  Lipid labs done in December 2023 shows total cholesterol 108, triglycerides 87, HDL 44, LDL 28.  She will continue on 40 mg of atorvastatin.  Instructed to  start OTC fish oil - and working with pharmacy to get vascepa at a more reasonable cost.     History of questionable MVP/MR: I reviewed the patient's most recent echocardiogram which was done in November, 2020 and at that time the mitral valve showed normal structure, no evidence of MR, no evidence of MVP.  The ejection fraction was 60%.  I am not sure where this diagnosis came from but her valve in 2020 was normal.

## 2024-07-12 NOTE — PATIENT INSTRUCTIONS
Thanks for following up in office today.    1)  If you cannot afford the vascepa or you do not qualify for assistance,  you can take over the counter fish oil 2 grams twice a day.     2)  Your blood pressure looks reasonable today, but you tell me that you have taken any of your meds yet today.  Keep an eye on your blood pressures at home.  Goal blood pressure is <130/80.  Let us know if you find consistent readings greater than this.    3)  Please continue your cardiac medications as prescribed.    4) I hear some wheezing in your lungs.  I want you to talk to Dr Taylor about this.    Follow up with BREANA Arrieta NP in  6 months  If you have any questions, please call [phone] and choose option for Dr. Hoffmann's nurse Marianne Peña

## 2024-07-22 ENCOUNTER — APPOINTMENT (OUTPATIENT)
Dept: PHARMACY | Facility: HOSPITAL | Age: 69
End: 2024-07-22
Payer: MEDICARE

## 2024-07-22 DIAGNOSIS — E78.1 HYPERTRIGLYCERIDEMIA: Primary | ICD-10-CM

## 2024-07-22 DIAGNOSIS — E11.9 TYPE 2 DIABETES MELLITUS WITHOUT COMPLICATION, WITHOUT LONG-TERM CURRENT USE OF INSULIN (MULTI): ICD-10-CM

## 2024-07-22 PROCEDURE — RXMED WILLOW AMBULATORY MEDICATION CHARGE

## 2024-07-22 RX ORDER — TIRZEPATIDE 5 MG/.5ML
5 INJECTION, SOLUTION SUBCUTANEOUS
Qty: 2 ML | Refills: 0 | Status: SHIPPED | OUTPATIENT
Start: 2024-07-28

## 2024-07-22 RX ORDER — ICOSAPENT ETHYL 1 G/1
2 CAPSULE ORAL
Qty: 120 CAPSULE | Refills: 3 | Status: SHIPPED | OUTPATIENT
Start: 2024-07-22 | End: 2024-11-19

## 2024-07-22 NOTE — ASSESSMENT & PLAN NOTE
Patient was previously established on Vascepa 2 g BID but had to discontinue due to cost. Now that she is approved for  PAP, cost of medication should be $0. She discussed with her cardiologist at last appointment, who agrees she should return to therapy if we are able to get covered. Will submit rx to [facility] for PAP today    Plan:  START Vascepa 2 gm BID  Counseled patient on MOA, expectations, side effects, duration of therapy, contraindications, administration techniques, and monitoring parameters  Answered all other questions and concerns  Rx to [facility]

## 2024-07-22 NOTE — ASSESSMENT & PLAN NOTE
Patient's diabetes is currently uncontrolled, as shown by most recent A1c of 8.8% (goal <7%).     Patient is overall doing well after 2 weeks of Mounjaro therapy. She does have intermittent nausea that is sporadic. She is not seeing any weight loss or curbing of her cravings at this point. We discuss options and she is agreeable to titration at the end of this box.      Plan:  INCREASE to Mounjaro 5 mg weekly  Rx to [facility] for PAP  CONTINUE glimepiride, metformin and pioglitazone as currently prescribed.

## 2024-07-22 NOTE — PROGRESS NOTES
Patient ID: Kaity Eric is a 68 y.o. female who presents for Diabetes.    Referring Provider: Mei Taylor DO  PCP: Mei Taylor DO   Last visit with PCP: 5/29/2024 Next visit with PCP: 12/3/2024    Endocrinologist: Dr. Osman Cr (next visit 10/1/2024)    Subjective       Diabetes    Current  Pharmacotherapy:   Glimepiride 4 mg BID  Metformin XL 2000 mg daily  Pioglitazone 45 mg daily  Mounjaro 2.5 mg weekly     SECONDARY PREVENTION  - Statin? Atorvastatin 40 mg   - ACE-I/ARB? Lisinopril 20 mg   - Aspirin? No    -The ASCVD Risk score (Clay MCDERMOTT, et al., 2019) failed to calculate for the following reasons:    The valid total cholesterol range is 130 to 320 mg/dL      Current monitoring regimen:   Patient is using:      SMBG Fasting Readings:     Pertinent PMH Review:  - PMH of Pancreatitis: no  - PMH/FH of Medullary Thyroid Cancer: no  - PMH of Retinopathy: no      VAF Application    Medication(s): Mounjaro    Application response: [Approved]    Approved until: 6/28/2025    Additional information:  - Patient will get this prescription mailed to them from  pharmacies to receive medication at no cost  - Patient will need to re-enroll in this program prior to expiration date to maintain coverage          Review of Systems      Objective     LMP  (LMP Unknown)    BP Readings from Last 4 Encounters:   07/12/24 136/74   05/29/24 124/82   05/02/24 128/66   01/09/24 120/76      There were no vitals filed for this visit.     Labs  Lab Results   Component Value Date    BILITOT 0.4 12/04/2023    CALCIUM 9.5 05/02/2024    CO2 31 05/02/2024    CL 99 05/02/2024    CREATININE 0.75 05/02/2024    GLUCOSE 231 (H) 05/02/2024    ALKPHOS 58 12/04/2023    K 4.7 05/02/2024    PROT 5.9 (L) 12/04/2023     (L) 05/02/2024    AST 15 12/04/2023    ALT 12 12/04/2023    BUN 13 05/02/2024    ANIONGAP 10 05/02/2024    ALBUMIN 3.8 12/04/2023    GFRF 75 08/22/2023     Lab Results   Component Value Date    TRIG 87  12/04/2023    CHOL 108 12/04/2023    LDLCALC 46 12/04/2023    HDL 44.2 12/04/2023     Lab Results   Component Value Date    HGBA1C 8.8 (H) 05/02/2024       Current Outpatient Medications   Medication Instructions    atorvastatin (LIPITOR) 40 mg, oral, Daily    cyanocobalamin (VITAMIN B-12) 2,000 mcg, oral, Daily    ferrous sulfate (325 mg ferrous sulfate) 325 mg, oral, 3 times weekly    glimepiride (AMARYL) 4 mg, oral, 2 times daily    icosapent ethyL (VASCEPA) 2 g, oral, 2 times daily (morning and late afternoon)    lisinopriL-hydrochlorothiazide 20-25 mg tablet 1 tablet, oral, Daily    metFORMIN XR (GLUCOPHAGE-XR) 2,000 mg, oral, Daily    [START ON 7/28/2024] Mounjaro 5 mg, subcutaneous, Once Weekly    OneTouch Ultra Test strip check fasting blood sugar once daily.    pioglitazone (ACTOS) 45 mg, oral, Daily    venlafaxine XR (EFFEXOR-XR) 75 mg, oral, Daily, Take with food.         Drug Interactions;  None at time of review    Assessment/Plan   Problem List Items Addressed This Visit       Hypertriglyceridemia - Primary     Patient was previously established on Vascepa 2 g BID but had to discontinue due to cost. Now that she is approved for  PAP, cost of medication should be $0. She discussed with her cardiologist at last appointment, who agrees she should return to therapy if we are able to get covered. Will [facility] to [facility] today    Plan:  START Vascepa 2 gm BID  Counseled patient on MOA, expectations, side effects, duration of therapy, contraindications, administration techniques, and monitoring parameters  Answered all other questions and concerns  [facility]         Relevant Medications    icosapent ethyL (Vascepa) 1 gram capsule    Other Relevant Orders    Follow Up In Clinical Pharmacy    Type 2 diabetes mellitus without complication, without long-term current use of insulin (Multi)     Patient's diabetes is currently uncontrolled, as shown by most recent A1c of 8.8% (goal <7%).      Patient is overall doing well after 2 weeks of Mounjaro therapy. She does have intermittent nausea that is sporadic. She is not seeing any weight loss or curbing of her cravings at this point. We discuss options and she is agreeable to titration at the end of this box.      Plan:  INCREASE to Mounjaro 5 mg weekly  Rx to [facility] for PAP  CONTINUE glimepiride, metformin and pioglitazone as currently prescribed.          Relevant Medications    tirzepatide (Mounjaro) 5 mg/0.5 mL pen injector (Start on 7/28/2024)    Other Relevant Orders    Follow Up In Clinical Pharmacy       Follow-up: 8/19 @ 12:30 AM     Time spent with pt: Total length of time 10(minutes) of the encounter and more than 50% was spent counseling the patient.    Tootie Zheng, PharmD    Continue all meds under the continuation of care with the referring provider and clinical pharmacy team.

## 2024-07-24 ENCOUNTER — PHARMACY VISIT (OUTPATIENT)
Dept: PHARMACY | Facility: CLINIC | Age: 69
End: 2024-07-24
Payer: COMMERCIAL

## 2024-08-09 DIAGNOSIS — E11.9 TYPE 2 DIABETES MELLITUS WITHOUT COMPLICATION, WITHOUT LONG-TERM CURRENT USE OF INSULIN (MULTI): ICD-10-CM

## 2024-08-09 RX ORDER — PIOGLITAZONEHYDROCHLORIDE 45 MG/1
45 TABLET ORAL DAILY
Qty: 90 TABLET | Refills: 1 | Status: SHIPPED | OUTPATIENT
Start: 2024-08-09 | End: 2025-02-05

## 2024-08-14 ENCOUNTER — TELEPHONE (OUTPATIENT)
Dept: ENDOCRINOLOGY | Facility: CLINIC | Age: 69
End: 2024-08-14
Payer: MEDICARE

## 2024-08-14 NOTE — TELEPHONE ENCOUNTER
Clarence donaldson called to request pioglitazone renewal advised that requests are not accepted from pharmacy. Patient notified of requests and that rx was presviously sent to [facility] per 8/9/24 note.    No further action needed.

## 2024-08-19 ENCOUNTER — APPOINTMENT (OUTPATIENT)
Dept: PHARMACY | Facility: HOSPITAL | Age: 69
End: 2024-08-19
Payer: MEDICARE

## 2024-08-19 DIAGNOSIS — E78.1 HYPERTRIGLYCERIDEMIA: ICD-10-CM

## 2024-08-19 DIAGNOSIS — E11.9 TYPE 2 DIABETES MELLITUS WITHOUT COMPLICATION, WITHOUT LONG-TERM CURRENT USE OF INSULIN (MULTI): ICD-10-CM

## 2024-08-19 RX ORDER — BLOOD SUGAR DIAGNOSTIC
STRIP MISCELLANEOUS
Qty: 100 STRIP | Refills: 3 | Status: SHIPPED | OUTPATIENT
Start: 2024-08-19

## 2024-08-19 RX ORDER — METFORMIN HYDROCHLORIDE 500 MG/1
2000 TABLET, EXTENDED RELEASE ORAL DAILY
Qty: 360 TABLET | Refills: 1 | Status: SHIPPED | OUTPATIENT
Start: 2024-08-19 | End: 2025-02-15

## 2024-08-19 RX ORDER — TIRZEPATIDE 7.5 MG/.5ML
7.5 INJECTION, SOLUTION SUBCUTANEOUS
Qty: 2 ML | Refills: 1 | Status: SHIPPED | OUTPATIENT
Start: 2024-08-25 | End: 2024-10-20

## 2024-08-19 NOTE — ASSESSMENT & PLAN NOTE
Patient's diabetes is currently uncontrolled, as shown by most recent A1c of 8.8% (goal <7%).     Patient is overall doing well after 2 weeks of Mounjaro 5mg therapy. She is not seeing much weight loss or curbing of her cravings at this point. Discussed increasing protein in her diet. Patient is agreeable to Mounjaro titration at the end of this box.  Experiencing some lightheadedness in the late afternoons with readings in the 80s-90s.     Plan:  INCREASE to Mounjaro 7.5 mg weekly  Rx to [facility] for PAP  CONTINUE glimepiride, metformin and pioglitazone as currently prescribed.   Plan to REDUCE glimepiride to 2mg BID when increasing Mounjaro to 7.5mg weekly.   Monitoring and Education  Patient advised to call me if she experiences any readings less than 70mg/dL  Educated on increasing protein consumption and decreasing consumption of carbs  Due for new A1c, lipid panel, urine albumin creatinine ratio before next visit
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
## 2024-08-20 PROCEDURE — RXMED WILLOW AMBULATORY MEDICATION CHARGE

## 2024-08-28 ENCOUNTER — TELEMEDICINE (OUTPATIENT)
Dept: PHARMACY | Facility: HOSPITAL | Age: 69
End: 2024-08-28
Payer: MEDICARE

## 2024-08-28 DIAGNOSIS — E11.9 TYPE 2 DIABETES MELLITUS WITHOUT COMPLICATION, WITHOUT LONG-TERM CURRENT USE OF INSULIN (MULTI): ICD-10-CM

## 2024-08-28 DIAGNOSIS — E11.9 TYPE 2 DIABETES MELLITUS WITHOUT COMPLICATION, WITHOUT LONG-TERM CURRENT USE OF INSULIN (MULTI): Primary | ICD-10-CM

## 2024-08-28 RX ORDER — TIRZEPATIDE 5 MG/.5ML
5 INJECTION, SOLUTION SUBCUTANEOUS WEEKLY
Qty: 2 ML | Refills: 0 | Status: SHIPPED | OUTPATIENT
Start: 2024-08-28 | End: 2024-08-28

## 2024-08-28 RX ORDER — TIRZEPATIDE 5 MG/.5ML
5 INJECTION, SOLUTION SUBCUTANEOUS WEEKLY
Qty: 2 ML | Refills: 0 | Status: SHIPPED | OUTPATIENT
Start: 2024-08-28

## 2024-08-28 NOTE — ASSESSMENT & PLAN NOTE
Patient's diabetes is currently uncontrolled, as shown by most recent A1c of 8.8% (goal <7%).     8/28: Patient experiencing GI symptoms for the last week after 4 doses of Mounjaro 5mg. Uncertain if symptoms are related to Mounjaro or external factors. After discussion with patient, decided to hold off on previously planned upon dose increase.    ADDENDUM: 9/5/24. Prescription for Mounjaro was erroneously sent to incorrect pharmacy. Patient called because she never received delivery. At this time, all GI concerns have resolved for patient and she would like to increase dose of Mounjaro to 7.5mg. Pharmacist agreeable. Sent new Rx for Mounjaro 7.5mg to [facility] with note to deliver ASAP. Advised patient to take dose whenever she gets it. If she receives tomorrow, will take on Friday, 9/6 then slowly move back to normal schedule of Monday. Plan to inject on 9/6, 9/11, and 9/16 to get back on track.    Plan:  CONTINUE   glimepiride, metformin and pioglitazone as currently prescribed.   INCREASE  Mounjaro 7.5mg once weekly  Monitoring and Education  Patient advised to call me if she experiences any readings less than 70mg/dL  Educated on increasing protein consumption and decreasing consumption of carbs  Due for new A1c, lipid panel, urine albumin creatinine ratio before next visit   Statement Selected
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
## 2024-08-30 ENCOUNTER — PHARMACY VISIT (OUTPATIENT)
Dept: PHARMACY | Facility: CLINIC | Age: 69
End: 2024-08-30
Payer: COMMERCIAL

## 2024-09-05 PROCEDURE — RXMED WILLOW AMBULATORY MEDICATION CHARGE

## 2024-09-06 ENCOUNTER — PHARMACY VISIT (OUTPATIENT)
Dept: PHARMACY | Facility: CLINIC | Age: 69
End: 2024-09-06
Payer: COMMERCIAL

## 2024-09-16 ENCOUNTER — APPOINTMENT (OUTPATIENT)
Dept: PHARMACY | Facility: HOSPITAL | Age: 69
End: 2024-09-16
Payer: MEDICARE

## 2024-09-16 DIAGNOSIS — E11.9 TYPE 2 DIABETES MELLITUS WITHOUT COMPLICATION, WITHOUT LONG-TERM CURRENT USE OF INSULIN (MULTI): ICD-10-CM

## 2024-09-16 DIAGNOSIS — E78.1 HYPERTRIGLYCERIDEMIA: ICD-10-CM

## 2024-09-16 NOTE — PROGRESS NOTES
"Patient ID: Kaity Eric is a 68 y.o. female who presents for Diabetes    Referring Provider: Mei Taylor DO  PCP: Mei Taylor DO   Last visit with PCP: 5/29/2024 Next visit with PCP: 12/3/2024    Endocrinologist: Dr. Osman Cr (next visit 10/1/2024)    Subjective     Diabetes    Current Pharmacotherapy:   Glimepiride 4 mg BID  Metformin XL 2000 mg daily  Pioglitazone 45 mg daily  Mounjaro 7.5 mg weekly (Mondays)  Patient experienced some delay in receiving her Mounjaro injection. Missed one week. Started 7.5mg dose last Tuesday (9/10).  BRIANNA: Describes some random bouts of nausea. Gag reflex from smell of cat food. Not getting in the way of anything   No weight loss yet    Reports some missed days of her oral medication: 3 over the last couple weeks. Has been busy house guests, cares for mother and brother). Trouble finding pill box big enough. Advised to set an alarm to improve adherence, place pills by coffee pot. Patient says she has trouble with her appetite in the morning. Protein shakes not very palatable. Will try other brands.     Note: previously tried Jardiance 25mg daily but experienced yeast infections    Diet  Encouraged patient to increase consumption of protein and vegetable and decrease consumption of carbohydrates. Patient recently bought Elevation Protein Shakes with 30g protein and 1g sugar. Patient plans to drink these when she is on the go.     Guests here. Diet has been \"not great\". Trying to figure out protein. Wants to be closer to 80g day.    Exercise  Tried to increased physical activity too fast. Now has hairline fracture.     SECONDARY PREVENTION  - Statin? Atorvastatin 40 mg   - ACE-I/ARB? Lisinopril 20 mg   - Aspirin? No    -The ASCVD Risk score (Clay DK, et al., 2019) failed to calculate for the following reasons:    The valid total cholesterol range is 130 to 320 mg/dL      Current monitoring regimen:   Patient is using: glucometer     SMBG Fasting Readings from "   From 9/11: 1 week. 166 208 196 147 135 184   States readings were highest in her week without Mounjaro  Last week of August.  8/1/24: 132, 169, 174, 144, 119, 131, 157, 166, 176, 159, 190, 90. 150, 183.   SMBG:   Noted that when readings are very high, she usually skipped her pills the day before     Pertinent PMH Review:  - PMH of Pancreatitis: no  - PMH/FH of Medullary Thyroid Cancer: no  - PMH of Retinopathy: no      VAF Application    Medication(s): Mounjaro    Application response: [Approved]    Approved until: 6/28/2025    Additional information:  - Patient will get this prescription mailed to them from  pharmacies to receive medication at no cost  - Patient will need to re-enroll in this program prior to expiration date to maintain coverage    Review of Systems      Objective     LMP  (LMP Unknown)    BP Readings from Last 4 Encounters:   07/12/24 136/74   05/29/24 124/82   05/02/24 128/66   01/09/24 120/76      There were no vitals filed for this visit.     Labs  Lab Results   Component Value Date    BILITOT 0.4 12/04/2023    CALCIUM 9.5 05/02/2024    CO2 31 05/02/2024    CL 99 05/02/2024    CREATININE 0.75 05/02/2024    GLUCOSE 231 (H) 05/02/2024    ALKPHOS 58 12/04/2023    K 4.7 05/02/2024    PROT 5.9 (L) 12/04/2023     (L) 05/02/2024    AST 15 12/04/2023    ALT 12 12/04/2023    BUN 13 05/02/2024    ANIONGAP 10 05/02/2024    ALBUMIN 3.8 12/04/2023    GFRF 75 08/22/2023     Lab Results   Component Value Date    TRIG 87 12/04/2023    CHOL 108 12/04/2023    LDLCALC 46 12/04/2023    HDL 44.2 12/04/2023     Lab Results   Component Value Date    HGBA1C 8.8 (H) 05/02/2024       Current Outpatient Medications   Medication Instructions    atorvastatin (LIPITOR) 40 mg, oral, Daily    cyanocobalamin (VITAMIN B-12) 2,000 mcg, oral, Daily    ferrous sulfate (325 mg ferrous sulfate) 325 mg, oral, 3 times weekly    glimepiride (AMARYL) 4 mg, oral, 2 times daily    icosapent ethyL (VASCEPA) 2 g, oral, 2 times  daily (morning and late afternoon)    lisinopriL-hydrochlorothiazide 20-25 mg tablet 1 tablet, oral, Daily    metFORMIN XR (GLUCOPHAGE-XR) 2,000 mg, oral, Daily    Mounjaro 7.5 mg, subcutaneous, Once Weekly    OneTouch Ultra Test strip Use to check blood sugar up to three times daily as directed    pioglitazone (ACTOS) 45 mg, oral, Daily    venlafaxine XR (EFFEXOR-XR) 75 mg, oral, Daily, Take with food.         Drug Interactions;  None at time of review    Assessment/Plan   Problem List Items Addressed This Visit       Hypertriglyceridemia     Continue Vascepa 2 gm BID  Lipid panel before next visit         Relevant Orders    Follow Up In Clinical Pharmacy    Type 2 diabetes mellitus without complication, without long-term current use of insulin (Multi)     Patient's diabetes is currently uncontrolled, as shown by most recent A1c of 8.8% (goal <7%). SMBG are above goal. Due to late delivery, missed one week of Mounjaro. Tolerated first dose of 7.5mg relatively well, but reports random bouts of nausea. Too soon to determine appropriateness of next dose increase. Will work to improve medication adherence and diet over the coming weeks. Labs before next call.    Plan:  CONTINUE   Mounjaro 7.5mg once weekly  Glimepiride 4 mg BID  Metformin XL 2000 mg daily  Pioglitazone 45 mg daily  Monitoring and Education  Patient advised to call me if she experiences any readings less than 70mg/dL  Educated on increasing protein consumption and decreasing consumption of carbs  Due for new A1c, lipid panel, urine albumin creatinine ratio before next visit         Relevant Orders    Follow Up In Clinical Pharmacy       Follow-up: 9/30 at 9:00. Patient advised to complete lab work in the week before visit.     Time spent with pt: Total length of time 20(minutes) of the encounter and more than 50% was spent counseling the patient.    Joceline Sepulveda, PharmD    Continue all meds under the continuation of care with the referring provider  and clinical pharmacy team.

## 2024-09-16 NOTE — ASSESSMENT & PLAN NOTE
Patient's diabetes is currently uncontrolled, as shown by most recent A1c of 8.8% (goal <7%). SMBG are above goal. Due to late delivery, missed one week of Mounjaro. Tolerated first dose of 7.5mg relatively well, but reports random bouts of nausea. Too soon to determine appropriateness of next dose increase. Will work to improve medication adherence and diet over the coming weeks. Labs before next call.    Plan:  CONTINUE   Mounjaro 7.5mg once weekly  Glimepiride 4 mg BID  Metformin XL 2000 mg daily  Pioglitazone 45 mg daily  Monitoring and Education  Patient advised to call me if she experiences any readings less than 70mg/dL  Educated on increasing protein consumption and decreasing consumption of carbs  Due for new A1c, lipid panel, urine albumin creatinine ratio before next visit

## 2024-09-26 ENCOUNTER — LAB (OUTPATIENT)
Dept: LAB | Facility: LAB | Age: 69
End: 2024-09-26
Payer: MEDICARE

## 2024-09-26 DIAGNOSIS — E78.5 HYPERLIPIDEMIA, UNSPECIFIED HYPERLIPIDEMIA TYPE: ICD-10-CM

## 2024-09-26 DIAGNOSIS — E11.9 TYPE 2 DIABETES MELLITUS WITHOUT COMPLICATION, WITHOUT LONG-TERM CURRENT USE OF INSULIN (MULTI): ICD-10-CM

## 2024-09-26 DIAGNOSIS — E78.1 HYPERTRIGLYCERIDEMIA: ICD-10-CM

## 2024-09-26 LAB
ALBUMIN SERPL BCP-MCNC: 4 G/DL (ref 3.4–5)
ALP SERPL-CCNC: 83 U/L (ref 33–136)
ALT SERPL W P-5'-P-CCNC: 13 U/L (ref 7–45)
ANION GAP SERPL CALC-SCNC: 11 MMOL/L (ref 10–20)
AST SERPL W P-5'-P-CCNC: 15 U/L (ref 9–39)
BILIRUB SERPL-MCNC: 0.5 MG/DL (ref 0–1.2)
BUN SERPL-MCNC: 12 MG/DL (ref 6–23)
CALCIUM SERPL-MCNC: 8.9 MG/DL (ref 8.6–10.3)
CHLORIDE SERPL-SCNC: 101 MMOL/L (ref 98–107)
CHOLEST SERPL-MCNC: 105 MG/DL (ref 0–199)
CHOLESTEROL/HDL RATIO: 2.7
CO2 SERPL-SCNC: 29 MMOL/L (ref 21–32)
CREAT SERPL-MCNC: 0.78 MG/DL (ref 0.5–1.05)
CREAT UR-MCNC: 128.6 MG/DL (ref 20–320)
EGFRCR SERPLBLD CKD-EPI 2021: 84 ML/MIN/1.73M*2
GLUCOSE SERPL-MCNC: 104 MG/DL (ref 74–99)
HDLC SERPL-MCNC: 38.3 MG/DL
LDLC SERPL CALC-MCNC: 36 MG/DL
MICROALBUMIN UR-MCNC: 11.5 MG/L
MICROALBUMIN/CREAT UR: 8.9 UG/MG CREAT
NON HDL CHOLESTEROL: 67 MG/DL (ref 0–149)
POTASSIUM SERPL-SCNC: 4.2 MMOL/L (ref 3.5–5.3)
PROT SERPL-MCNC: 6.6 G/DL (ref 6.4–8.2)
SODIUM SERPL-SCNC: 137 MMOL/L (ref 136–145)
TRIGL SERPL-MCNC: 152 MG/DL (ref 0–149)
VLDL: 30 MG/DL (ref 0–40)

## 2024-09-26 PROCEDURE — 83036 HEMOGLOBIN GLYCOSYLATED A1C: CPT

## 2024-09-26 PROCEDURE — 82570 ASSAY OF URINE CREATININE: CPT

## 2024-09-26 PROCEDURE — 80053 COMPREHEN METABOLIC PANEL: CPT

## 2024-09-26 PROCEDURE — 82043 UR ALBUMIN QUANTITATIVE: CPT

## 2024-09-26 PROCEDURE — 36415 COLL VENOUS BLD VENIPUNCTURE: CPT

## 2024-09-26 PROCEDURE — RXMED WILLOW AMBULATORY MEDICATION CHARGE

## 2024-09-26 PROCEDURE — 80061 LIPID PANEL: CPT

## 2024-09-27 LAB
EST. AVERAGE GLUCOSE BLD GHB EST-MCNC: 134 MG/DL
HBA1C MFR BLD: 6.3 %

## 2024-09-30 ENCOUNTER — PHARMACY VISIT (OUTPATIENT)
Dept: PHARMACY | Facility: CLINIC | Age: 69
End: 2024-09-30
Payer: COMMERCIAL

## 2024-09-30 ENCOUNTER — APPOINTMENT (OUTPATIENT)
Dept: PHARMACY | Facility: HOSPITAL | Age: 69
End: 2024-09-30
Payer: MEDICARE

## 2024-09-30 DIAGNOSIS — E11.9 TYPE 2 DIABETES MELLITUS WITHOUT COMPLICATION, WITHOUT LONG-TERM CURRENT USE OF INSULIN (MULTI): Primary | ICD-10-CM

## 2024-09-30 DIAGNOSIS — E78.1 HYPERTRIGLYCERIDEMIA: ICD-10-CM

## 2024-09-30 PROCEDURE — RXMED WILLOW AMBULATORY MEDICATION CHARGE

## 2024-09-30 RX ORDER — TIRZEPATIDE 10 MG/.5ML
10 INJECTION, SOLUTION SUBCUTANEOUS WEEKLY
Qty: 2 ML | Refills: 0 | Status: SHIPPED | OUTPATIENT
Start: 2024-09-30

## 2024-09-30 NOTE — ASSESSMENT & PLAN NOTE
Triglycerides are slightly above goal (152) on Vascaepa 2g BID. Labs slightly worsened from last check in December 2023. Patient notes previous therapy interruption due to cost. Admits some poor adherence this year. Patient educated on importance of adherence to regimen.   LFTs are WNL   Lab Results   Component Value Date    ALT 13 09/26/2024    AST 15 09/26/2024    ALKPHOS 83 09/26/2024    BILITOT 0.5 09/26/2024     Medication Changes:  CONTINUE  Vascepa 2g BID  Atorvastatin 40mg daily

## 2024-09-30 NOTE — ASSESSMENT & PLAN NOTE
Patient's diabetes is currently controlled, as shown by most recent A1c of 6.3% (goal <7%). SMBG are at goal. Tolerating Mounjaro 7.5mg relatively well, but notes some random bouts of nausea and constipation. Most recent A1c greatly improved (8.8%-->6.3%), concern for hypoglycemia in late afternoon. Advised patient that we can stop pushing Mounjaro dose; however, patient would like to try 10mg dose to optimize weight loss. Will discontinue glimeperide and increase Mounjaro while monitoring carefully for side effects. Patient aware that we may need to step back to Mounjaro 7.5mg if side effects worsen.    Plan:  CONTINUE   Metformin XL 2000 mg daily  Pioglitazone 45 mg daily  INCREASE  Mounjaro 10mg once weekly  STOP  Glimeperide 4mg BID  Monitoring and Education  Patient advised to call me if she experiences any readings less than 70mg/dL  Educated on increasing protein consumption and decreasing consumption of carbs

## 2024-09-30 NOTE — PROGRESS NOTES
Patient ID: Kaity Eric is a 69 y.o. female who presents for Diabetes and Hyperlipidemia    Referring Provider: Mei Taylor DO  PCP: Mei Taylor DO   Last visit with PCP: 5/29/2024 Next visit with PCP: 12/3/2024    Endocrinologist: Dr. Osman Cr (next visit 10/1/2024)    Subjective     Diabetes    Current Pharmacotherapy:   Glimepiride 4 mg BID  Metformin XL 2000 mg daily  Pioglitazone 45 mg daily  Mounjaro 7.5 mg weekly (Mondays, fourth dose today)  BRIANNA:   Describes some random bouts of nausea. Not getting in the way of anything. Indigestion resolved  Nausea triggered by grapes, iron supplementation, smell of cat food  Constipation. Working to add fiber to diet. May try Miralax.  Weight loss: Not weighed yet today. Feels like clothes are looser. Walking boot for 2-4 weeks.      Note: previously tried Jardiance 25mg daily but experienced yeast infections    Diet  Encouraged patient to increase consumption of protein and vegetable and decrease consumption of carbohydrates. Patient working to eat 80g protein in a day. Tried protein shakes and powder- not palatable. Not hungry in the morning. Recommended she may try low sugar greek yogurt, protein-enriched oatmeal, C[place] cakes.    Exercise  Tried to increased physical activity too fast. Now has hairline fracture.     SECONDARY PREVENTION  - Statin? Atorvastatin 40 mg   - ACE-I/ARB? Lisinopril 20 mg   - Aspirin? No    -The ASCVD Risk score (Clay DK, et al., 2019) failed to calculate for the following reasons:    The valid total cholesterol range is 130 to 320 mg/dL      Current monitoring regimen:   Patient is using: glucometer     SMBG Fasting Readings from 9/23: 124, 108, 108, 161, 93, 116.   Notes some shakiness in the late afternoons. Suspect hypoglycemia.    From 9/11: 166 208 196 147 135 184   8/1/24: 132, 169, 174, 144, 119, 131, 157, 166, 176, 159, 190, 90. 150, 183.   SMBG:   Noted that when readings are very high, she usually skipped  her pills the day before     Pertinent PMH Review:  - PMH of Pancreatitis: no  - PMH/FH of Medullary Thyroid Cancer: no  - PMH of Retinopathy: no      VAF Application    Medication(s): Mounjaro, Vascepa    Application response: [Approved]    Approved until: 6/28/2025    Additional information:  - Patient will get this prescription mailed to them from  pharmacies to receive medication at no cost  - Patient will need to re-enroll in this program prior to expiration date to maintain coverage    Review of Systems      Objective     LMP  (LMP Unknown)    BP Readings from Last 4 Encounters:   07/12/24 136/74   05/29/24 124/82   05/02/24 128/66   01/09/24 120/76      There were no vitals filed for this visit.     Labs  Lab Results   Component Value Date    BILITOT 0.5 09/26/2024    CALCIUM 8.9 09/26/2024    CO2 29 09/26/2024     09/26/2024    CREATININE 0.78 09/26/2024    GLUCOSE 104 (H) 09/26/2024    ALKPHOS 83 09/26/2024    K 4.2 09/26/2024    PROT 6.6 09/26/2024     09/26/2024    AST 15 09/26/2024    ALT 13 09/26/2024    BUN 12 09/26/2024    ANIONGAP 11 09/26/2024    ALBUMIN 4.0 09/26/2024    GFRF 75 08/22/2023     Lab Results   Component Value Date    TRIG 152 (H) 09/26/2024    CHOL 105 09/26/2024    LDLCALC 36 09/26/2024    HDL 38.3 09/26/2024     Lab Results   Component Value Date    HGBA1C 6.3 (H) 09/26/2024       Current Outpatient Medications   Medication Instructions    atorvastatin (LIPITOR) 40 mg, oral, Daily    cyanocobalamin (VITAMIN B-12) 2,000 mcg, oral, Daily    ferrous sulfate (325 mg ferrous sulfate) 325 mg, oral, 3 times weekly    icosapent ethyL (VASCEPA) 2 g, oral, 2 times daily (morning and late afternoon)    lisinopriL-hydrochlorothiazide 20-25 mg tablet 1 tablet, oral, Daily    metFORMIN XR (GLUCOPHAGE-XR) 2,000 mg, oral, Daily    Mounjaro 10 mg, subcutaneous, Weekly    OneTouch Ultra Test strip Use to check blood sugar up to three times daily as directed    pioglitazone (ACTOS) 45  mg, oral, Daily    venlafaxine XR (EFFEXOR-XR) 75 mg, oral, Daily, Take with food.         Drug Interactions;  None at time of review    Assessment/Plan   Problem List Items Addressed This Visit       Hypertriglyceridemia     Triglycerides are slightly above goal (152) on Vascaepa 2g BID. Labs slightly worsened from last check in December 2023. Patient notes previous therapy interruption due to cost. Admits some poor adherence this year. Patient educated on importance of adherence to regimen.   LFTs are WNL   Lab Results   Component Value Date    ALT 13 09/26/2024    AST 15 09/26/2024    ALKPHOS 83 09/26/2024    BILITOT 0.5 09/26/2024     Medication Changes:  CONTINUE  Vascepa 2g BID  Atorvastatin 40mg daily         Relevant Orders    Follow Up In Clinical Pharmacy    Type 2 diabetes mellitus without complication, without long-term current use of insulin (Multi) - Primary     Patient's diabetes is currently controlled, as shown by most recent A1c of 6.3% (goal <7%). SMBG are at goal. Tolerating Mounjaro 7.5mg relatively well, but notes some random bouts of nausea and constipation. Most recent A1c greatly improved (8.8%-->6.3%), concern for hypoglycemia in late afternoon. Advised patient that we can stop pushing Mounjaro dose; however, patient would like to try 10mg dose to optimize weight loss. Will discontinue glimeperide and increase Mounjaro while monitoring carefully for side effects. Patient aware that we may need to step back to Mounjaro 7.5mg if side effects worsen.    Plan:  CONTINUE   Metformin XL 2000 mg daily  Pioglitazone 45 mg daily  INCREASE  Mounjaro 10mg once weekly  STOP  Glimeperide 4mg BID  Monitoring and Education  Patient advised to call me if she experiences any readings less than 70mg/dL  Educated on increasing protein consumption and decreasing consumption of carbs         Relevant Medications    tirzepatide (Mounjaro) 10 mg/0.5 mL pen injector    Other Relevant Orders    Follow Up In  Clinical Pharmacy       Follow-up: 10/28 at 9:00.     Time spent with pt: Total length of time 20(minutes) of the encounter and more than 50% was spent counseling the patient.    Joceline Sepulveda, PharmD    Continue all meds under the continuation of care with the referring provider and clinical pharmacy team.

## 2024-10-01 ENCOUNTER — APPOINTMENT (OUTPATIENT)
Dept: ENDOCRINOLOGY | Facility: CLINIC | Age: 69
End: 2024-10-01
Payer: MEDICARE

## 2024-10-01 VITALS
HEART RATE: 73 BPM | BODY MASS INDEX: 36.32 KG/M2 | DIASTOLIC BLOOD PRESSURE: 74 MMHG | HEIGHT: 65 IN | WEIGHT: 218 LBS | SYSTOLIC BLOOD PRESSURE: 120 MMHG

## 2024-10-01 DIAGNOSIS — E11.9 TYPE 2 DIABETES MELLITUS WITHOUT COMPLICATION, WITHOUT LONG-TERM CURRENT USE OF INSULIN (MULTI): Primary | ICD-10-CM

## 2024-10-01 DIAGNOSIS — E78.1 HYPERTRIGLYCERIDEMIA: ICD-10-CM

## 2024-10-01 PROCEDURE — 3044F HG A1C LEVEL LT 7.0%: CPT | Performed by: INTERNAL MEDICINE

## 2024-10-01 PROCEDURE — 3078F DIAST BP <80 MM HG: CPT | Performed by: INTERNAL MEDICINE

## 2024-10-01 PROCEDURE — 3048F LDL-C <100 MG/DL: CPT | Performed by: INTERNAL MEDICINE

## 2024-10-01 PROCEDURE — 3061F NEG MICROALBUMINURIA REV: CPT | Performed by: INTERNAL MEDICINE

## 2024-10-01 PROCEDURE — 1159F MED LIST DOCD IN RCRD: CPT | Performed by: INTERNAL MEDICINE

## 2024-10-01 PROCEDURE — 99214 OFFICE O/P EST MOD 30 MIN: CPT | Performed by: INTERNAL MEDICINE

## 2024-10-01 PROCEDURE — 1160F RVW MEDS BY RX/DR IN RCRD: CPT | Performed by: INTERNAL MEDICINE

## 2024-10-01 PROCEDURE — 3074F SYST BP LT 130 MM HG: CPT | Performed by: INTERNAL MEDICINE

## 2024-10-01 PROCEDURE — 3008F BODY MASS INDEX DOCD: CPT | Performed by: INTERNAL MEDICINE

## 2024-10-01 PROCEDURE — G2211 COMPLEX E/M VISIT ADD ON: HCPCS | Performed by: INTERNAL MEDICINE

## 2024-10-01 ASSESSMENT — ENCOUNTER SYMPTOMS: CONSTIPATION: 1

## 2024-10-01 NOTE — PROGRESS NOTES
"Jose Juan Eric is a 69 y.o. female who presents for follow-up of Type 2 diabetes mellitus.     She has a right air cast since labor day for a stress fracture.      Indigestion has gotten better.  She has severe constipation     Known complications due to diabetes included obesity    Cardiovascular risk factors include advanced age (older than 55 for men, 65 for women) and obesity (BMI >= 30 kg/m2). The patient is on an ACE inhibitor or angiotensin II receptor blocker.  The patient has not been previously hospitalized due to diabetic ketoacidosis.     Current symptoms/problems include none. Her clinical course has been stable.     The patient is currently checking the blood glucose one time per day.    Patient is using: glucometer                Hypoglycemia frequency: Yes    Hypoglycemia awareness:  Yes when 100mg/dL      Current Medication Regimen  Glimepiride 4mg twice daily   Pioglitazone 45mg once daily   Metformin 2 grams daily      Exercise regimen -denies    Review of Systems   Gastrointestinal:  Positive for constipation.   All other systems reviewed and are negative.    Objective   Visit Vitals  LMP  (LMP Unknown)   OB Status Postmenopausal   Smoking Status Never     Visit Vitals  /74 (BP Location: Left arm, Patient Position: Sitting, BP Cuff Size: Adult)   Pulse 73   Ht 1.651 m (5' 5\")   Wt 98.9 kg (218 lb)   LMP  (LMP Unknown)   BMI 36.28 kg/m²   OB Status Postmenopausal   Smoking Status Never   BSA 2.13 m²       Physical Exam  Vitals and nursing note reviewed.   Constitutional:       General: She is not in acute distress.     Appearance: Normal appearance. She is obese.   HENT:      Head: Normocephalic and atraumatic.      Nose: Nose normal.      Mouth/Throat:      Mouth: Mucous membranes are moist.   Eyes:      Extraocular Movements: Extraocular movements intact.   Cardiovascular:      Rate and Rhythm: Normal rate and regular rhythm.   Pulmonary:      Effort: Pulmonary effort is " normal.      Breath sounds: Normal breath sounds.   Musculoskeletal:         General: Normal range of motion.      Right foot: Normal range of motion. No deformity.      Left foot: Normal range of motion. No deformity.   Feet:      Right foot:      Skin integrity: Skin integrity normal. No ulcer or blister.      Toenail Condition: Right toenails are normal.      Left foot:      Skin integrity: Skin integrity normal. No ulcer or blister.      Toenail Condition: Left toenails are normal.   Skin:     General: Skin is warm.   Neurological:      Mental Status: She is alert and oriented to person, place, and time.   Psychiatric:         Mood and Affect: Mood normal.       Lab Review  Glucose (mg/dL)   Date Value   09/26/2024 104 (H)   05/02/2024 231 (H)   12/04/2023 144 (H)     POC HEMOGLOBIN A1c (%)   Date Value   01/08/2024 7.2 (A)     Hemoglobin A1C (%)   Date Value   09/26/2024 6.3 (H)   05/02/2024 8.8 (H)   08/22/2023 8.0 (A)   04/27/2023 8.8 (A)     Bicarbonate (mmol/L)   Date Value   09/26/2024 29   05/02/2024 31   12/04/2023 26     Urea Nitrogen (mg/dL)   Date Value   09/26/2024 12   05/02/2024 13   12/04/2023 13     Creatinine (mg/dL)   Date Value   09/26/2024 0.78   05/02/2024 0.75   12/04/2023 0.61     Lab Results   Component Value Date    CHOL 105 09/26/2024    CHOL 108 12/04/2023    CHOL 101 08/22/2023     Lab Results   Component Value Date    HDL 38.3 09/26/2024    HDL 44.2 12/04/2023    HDL 39.8 (A) 08/22/2023     Lab Results   Component Value Date    LDLCALC 36 09/26/2024    LDLCALC 46 12/04/2023     Lab Results   Component Value Date    TRIG 152 (H) 09/26/2024    TRIG 87 12/04/2023    TRIG 164 (H) 08/22/2023       Health Maintenance:   Foot Exam: May 2, 2024  Eye Exam: October 17, 2023  Urine Albumin: September 26, 2024    Assessment/Plan   69-year-old female presents for follow up for type 2 diabetes mellitus. Her blood pressure is at goal. She is noted to be on an ACE inhibitor. She is noted to be on a  statin.    Type 2 diabetes mellitus without complication, without long-term current use of insulin (Multi)  To continue Piogiltazone 45 mg once daily   To commence Mounjaro 10mg subcutaneous once weekly today   Nausea and vomiting is common with Mounjaro   Mounjaro and iron can also cause constipation   To continue Metformin 2 grams daily (take with food)  Agree with the cessation of Glimepiride   Your kidney function is normal     Hypertriglyceridemia  To continue Fish Oil 2 grams twice daily  To continue atorvastatin 40 mg once daily    For follow up in 5 months

## 2024-10-01 NOTE — PATIENT INSTRUCTIONS
Thank you for choosing [facility]  for your health care needs.  If you have any questions, concerns or medical needs, please feel free to contact our office at [phone].    Please ensure you complete your blood work one week before the next scheduled appointment.    To continue Piogiltazone 45 mg once daily   To commence Mounjaro 10mg subcutaneous once weekly today   Nausea and vomiting is common with Mounjaro   Mounjaro and iron can also cause constipation   To continue Metformin 2 grams daily (take with food)  Agree with the cessation of Glimepiride   Your kidney function is normal   To continue Fish Oil 2 grams twice daily as an alternative for Vascepa   Counseled that the goal A1C should be 7% or less and thus you are at goal   Counseled glycemic control is warranted to prevent microvascular complications  For follow up in 5 months

## 2024-10-03 ENCOUNTER — PHARMACY VISIT (OUTPATIENT)
Dept: PHARMACY | Facility: CLINIC | Age: 69
End: 2024-10-03
Payer: COMMERCIAL

## 2024-10-06 NOTE — ASSESSMENT & PLAN NOTE
To continue Fish Oil 2 grams twice daily  To continue atorvastatin 40 mg once daily    For follow up in 5 months

## 2024-10-06 NOTE — ASSESSMENT & PLAN NOTE
To continue Piogiltazone 45 mg once daily   To commence Mounjaro 10mg subcutaneous once weekly today   Nausea and vomiting is common with Mounjaro   Mounjaro and iron can also cause constipation   To continue Metformin 2 grams daily (take with food)  Agree with the cessation of Glimepiride   Your kidney function is normal

## 2024-10-25 NOTE — PROGRESS NOTES
Chief Complaint   Patient presents with    Initial Prenatal Visit        Patient identified by name and . Patient is a  at Unknown.    Taking prenatal vitamins: Not currently  Leakage of fluid: No  Vaginal bleeding: No  Feeling baby move if over 20 weeks: Too early  Contractions: No  Pain: No    Vitals:    10/25/24 1321   BP: (!) 144/85   Site: Left Upper Arm   Position: Sitting   Cuff Size: Medium Adult   Resp: 16   SpO2: 98%   Weight: 97.1 kg (214 lb)   Height: 1.59 m (5' 2.6\")          There is no immunization history on file for this patient.    1. Have you been to the ER, urgent care clinic since your last visit?  Hospitalized since your last visit?No    2. Have you seen or consulted any other health care providers outside of the [facility] since your last visit?  Include any pap smears or colon screening. No    Patient informed about upcoming appointments and given handout with date/time/location.                Chief Complaint/Reason for Visit:   Patient is coming in today as a 6 month Cardiovascular follow up.      History Of Present Illness:    Ms. Eric is coming today as a 6-month cardiovascular follow-up.  We have followed this patient previously for mitral regurgitation, hypertension, and hyperlipidemia.  She also has a history of DM 2.  At her last office visit, she was started on Vascepa and follow-up lipid labs were ordered.  Lipid labs from December, 2023 shows total cholesterol 108, triglycerides 87, HDL 44, LDL 28.    Patient comes in today generally feeling well.  She denies any chest pain, pressure, palpitations, orthopnea or orthopnea.  She has some mild ankle edema and admits to not watching the sodium in her diet.  Patient has been taking all of her medications as prescribed.  She believes she has run out of atorvastatin.  I sent renewals on atorvastatin and Vascepa. Recently diagnosed with Osteoporosis.    Past Medical History:  She has a past medical history of CN (constipation) (10/15/2008), Combined forms of age-related cataract of left eye (11/06/2020), Dry eye syndrome of both eyes (11/06/2020), History of phacoemulsification of cataract with intraocular lens implantation, right (01/08/2021), Personal history of diseases of the skin and subcutaneous tissue, and Personal history of diseases of the skin and subcutaneous tissue.    Past Surgical History:  She has a past surgical history that includes Other surgical history (07/12/2019) and Other surgical history (10/13/2020).      Social History:  She reports that she has never smoked. She has never used smokeless tobacco. No history on file for alcohol use and drug use.    Family History:  Family History   Problem Relation Name Age of Onset    Coronary artery disease Mother      Heart attack Father      Ovarian cancer Sister      Heart attack Brother      Melanoma Other          Allergies:  Latex and Red dye    Medications:  Current Outpatient  "Medications   Medication Instructions    atorvastatin (LIPITOR) 40 mg, oral, Daily    calcium carbonate-vitamin D3 600 mg-25 mcg (1,000 unit) capsule 2 tablets, oral, Daily    cyanocobalamin (VITAMIN B-12) 2,000 mcg, oral, Daily    ferrous sulfate (325 mg ferrous sulfate) 325 mg, oral, 3 times weekly    glimepiride (AMARYL) 2 mg, oral, 2 times daily    icosapent ethyL (Vascepa) 1 gram capsule 2 capsules, oral, 2 times daily    lisinopriL-hydrochlorothiazide 20-25 mg tablet 1 tablet, oral, Daily    metFORMIN XR (GLUCOPHAGE-XR) 2,000 mg, oral, Daily    OneTouch Ultra Test strip check fasting blood sugar once daily.    pioglitazone (ACTOS) 45 mg, oral, Daily    SITagliptin phosphate (JANUVIA) 100 mg, oral, Daily    venlafaxine XR (EFFEXOR-XR) 75 mg, oral, Daily, Take with food.       Review of Systems:  GEN: feeling well. no tiredness, fatigue, weight gain/loss, night sweats  HEENT: no vision loss, sore throat, swallowing problems, has vertigo  GI: no nausea/vomiting, diarrhea, bleeding in stools or dark stools  PULM: no SOB, wheezing, cough  NEURO: no focal weakness or vision changes,   RENAL: no blood in the urine  ID: no fever/chills, nausea, vomiting, dysuria, diarrhea  VASC: no bleeding  PSYCH: no depression/anxiety    Vitals:   Visit Vitals  /76   Pulse 68   Ht 1.651 m (5' 5\")   Wt 108 kg (237 lb)   LMP  (LMP Unknown)   BMI 39.44 kg/m²   OB Status Postmenopausal   Smoking Status Never   BSA 2.23 m²        Physical Exam:  GEN: well-nourished, no acute distress  SKIN: no rashes, well perfused  HEENT: normocephalic and atraumatic, no neck swelling  ORAL: lips and oropharynx normal appearance  CV: Normal S1 S2 regular, no carotid bruits, trace ankle edema, no venous prominence in the neck  RESP:lungs are clear, thorax symmetrical, normal respiratory effort and rate, no accessory muscle use  MSK: normal appearing muscle tone, normal range of motion, no joint swelling  NEURO: no gross focal neuro deficit, " oriented to time, place, and person  ABD: nondistended  PSYCH: normal mood and affect      Last Labs:  CBC -  Lab Results   Component Value Date    WBC 8.4 04/27/2023    HGB 14.6 04/27/2023    HCT 44.2 04/27/2023    MCV 94 04/27/2023     04/27/2023     Lab Results   Component Value Date    GLUCOSE 144 (H) 12/04/2023    CALCIUM 8.4 (L) 12/04/2023     12/04/2023    K 4.5 12/04/2023    CO2 26 12/04/2023     (H) 12/04/2023    BUN 13 12/04/2023    CREATININE 0.61 12/04/2023      CMP -  Lab Results   Component Value Date    CALCIUM 8.4 (L) 12/04/2023    PROT 5.9 (L) 12/04/2023    ALBUMIN 3.8 12/04/2023    AST 15 12/04/2023    ALT 12 12/04/2023    ALKPHOS 58 12/04/2023    BILITOT 0.4 12/04/2023       LIPID PANEL -   Lab Results   Component Value Date    CHOL 108 12/04/2023    TRIG 87 12/04/2023    HDL 44.2 12/04/2023    CHHDL 2.4 12/04/2023    LDLF 28 08/22/2023    VLDL 17 12/04/2023    NHDL 64 12/04/2023       Lab Results   Component Value Date    HGBA1C 7.2 (A) 01/08/2024       Last Cardiology Tests:    CONCLUSIONS:   1. The left ventricular systolic function is normal with a 60% estimated ejection fraction.   2. No evidence of mitral valve prolapse.       Lab review: I have personally reviewed the laboratory result(s)     Assessment/Plan:  Hypertension: Blood pressure today shows excellent control at 120/76.  She will continue on lisinopril/hydrochlorothiazide 20/25 mg daily.  I recommended she reduce the sodium content in her diet.    Hyperlipidemia: Patient has a history of hyperlipidemia.  At her last office visit she was continued on atorvastatin and started on Vascepa.  Lipid labs done in December 2023 shows total cholesterol 108, triglycerides 87, HDL 44, LDL 28.  She will continue on 40 mg of atorvastatin and Vascepa.  I encouraged her to work on a heart healthy low-sodium diet.    History of questionable MVP/MR: I reviewed the patient's most recent echocardiogram which was done in November,  2020 and at that time the mitral valve showed normal structure, no evidence of MR, no evidence of MVP.  The ejection fraction was 60%.  I am not sure where this diagnosis came from but her valve in 2020 was normal.    Overall, the patient is doing well from a cardiovascular standpoint.  She will follow-up with Dr. Hoffmann in 6 months.  Patient instructed to call with any cardiovascular complaints. All questions were answered.       Dragon dictation was utilized to create this document. Quite often unanticipated grammatical, syntax,  and other interpretive errors are inadvertently transcribed by the computer software.  Please disregard these errors.  Please excuse any errors that have escaped final proofreading.          Elodia Arrieta, APRN-CNP

## 2024-10-28 ENCOUNTER — APPOINTMENT (OUTPATIENT)
Dept: PHARMACY | Facility: HOSPITAL | Age: 69
End: 2024-10-28
Payer: MEDICARE

## 2024-10-28 DIAGNOSIS — E78.1 HYPERTRIGLYCERIDEMIA: ICD-10-CM

## 2024-10-28 DIAGNOSIS — E11.9 TYPE 2 DIABETES MELLITUS WITHOUT COMPLICATION, WITHOUT LONG-TERM CURRENT USE OF INSULIN (MULTI): ICD-10-CM

## 2024-10-28 PROCEDURE — RXMED WILLOW AMBULATORY MEDICATION CHARGE

## 2024-10-28 RX ORDER — TIRZEPATIDE 10 MG/.5ML
10 INJECTION, SOLUTION SUBCUTANEOUS WEEKLY
Qty: 2 ML | Refills: 0 | Status: SHIPPED | OUTPATIENT
Start: 2024-10-28

## 2024-10-30 ENCOUNTER — PHARMACY VISIT (OUTPATIENT)
Dept: PHARMACY | Facility: CLINIC | Age: 69
End: 2024-10-30
Payer: COMMERCIAL

## 2024-11-23 ENCOUNTER — LAB (OUTPATIENT)
Dept: LAB | Facility: LAB | Age: 69
End: 2024-11-23
Payer: MEDICARE

## 2024-11-23 DIAGNOSIS — E78.1 HYPERTRIGLYCERIDEMIA: ICD-10-CM

## 2024-11-23 DIAGNOSIS — I10 HYPERTENSION, UNSPECIFIED TYPE: Chronic | ICD-10-CM

## 2024-11-23 PROCEDURE — 80053 COMPREHEN METABOLIC PANEL: CPT

## 2024-11-23 PROCEDURE — 36415 COLL VENOUS BLD VENIPUNCTURE: CPT

## 2024-11-24 LAB
ALBUMIN SERPL BCP-MCNC: 4 G/DL (ref 3.4–5)
ALP SERPL-CCNC: 75 U/L (ref 33–136)
ALT SERPL W P-5'-P-CCNC: 24 U/L (ref 7–45)
ANION GAP SERPL CALC-SCNC: 14 MMOL/L (ref 10–20)
AST SERPL W P-5'-P-CCNC: 17 U/L (ref 9–39)
BILIRUB SERPL-MCNC: 0.6 MG/DL (ref 0–1.2)
BUN SERPL-MCNC: 9 MG/DL (ref 6–23)
CALCIUM SERPL-MCNC: 9.2 MG/DL (ref 8.6–10.6)
CHLORIDE SERPL-SCNC: 102 MMOL/L (ref 98–107)
CO2 SERPL-SCNC: 28 MMOL/L (ref 21–32)
CREAT SERPL-MCNC: 0.83 MG/DL (ref 0.5–1.05)
EGFRCR SERPLBLD CKD-EPI 2021: 77 ML/MIN/1.73M*2
GLUCOSE SERPL-MCNC: 158 MG/DL (ref 74–99)
POTASSIUM SERPL-SCNC: 3.9 MMOL/L (ref 3.5–5.3)
PROT SERPL-MCNC: 6.5 G/DL (ref 6.4–8.2)
SODIUM SERPL-SCNC: 140 MMOL/L (ref 136–145)

## 2024-11-25 ENCOUNTER — APPOINTMENT (OUTPATIENT)
Dept: PHARMACY | Facility: HOSPITAL | Age: 69
End: 2024-11-25
Payer: MEDICARE

## 2024-11-25 DIAGNOSIS — E78.1 HYPERTRIGLYCERIDEMIA: ICD-10-CM

## 2024-11-25 DIAGNOSIS — E11.9 TYPE 2 DIABETES MELLITUS WITHOUT COMPLICATION, WITHOUT LONG-TERM CURRENT USE OF INSULIN (MULTI): ICD-10-CM

## 2024-11-25 DIAGNOSIS — Z87.2 PERSONAL HISTORY OF DISEASES OF THE SKIN AND SUBCUTANEOUS TISSUE: Primary | ICD-10-CM

## 2024-11-25 PROCEDURE — RXMED WILLOW AMBULATORY MEDICATION CHARGE

## 2024-11-25 RX ORDER — HYDROCORTISONE 25 MG/G
CREAM TOPICAL 2 TIMES DAILY PRN
Qty: 28 G | Refills: 0 | Status: SHIPPED | OUTPATIENT
Start: 2024-11-25 | End: 2024-12-27

## 2024-11-25 RX ORDER — OMEPRAZOLE 20 MG/1
20 CAPSULE, DELAYED RELEASE ORAL DAILY
Qty: 30 CAPSULE | Refills: 1 | Status: SHIPPED | OUTPATIENT
Start: 2024-11-25 | End: 2025-01-24

## 2024-11-25 RX ORDER — TIRZEPATIDE 10 MG/.5ML
10 INJECTION, SOLUTION SUBCUTANEOUS WEEKLY
Qty: 2 ML | Refills: 0 | Status: SHIPPED | OUTPATIENT
Start: 2024-11-25 | End: 2024-11-25

## 2024-11-25 RX ORDER — TIRZEPATIDE 10 MG/.5ML
10 INJECTION, SOLUTION SUBCUTANEOUS WEEKLY
Qty: 2 ML | Refills: 0 | Status: SHIPPED | OUTPATIENT
Start: 2024-11-25

## 2024-11-25 RX ORDER — ICOSAPENT ETHYL 1 G/1
2 CAPSULE ORAL
Qty: 360 CAPSULE | Refills: 1 | Status: SHIPPED | OUTPATIENT
Start: 2024-11-25 | End: 2025-05-24

## 2024-11-25 RX ORDER — DAPAGLIFLOZIN 5 MG/1
5 TABLET, FILM COATED ORAL DAILY
Qty: 90 TABLET | Refills: 1 | Status: SHIPPED | OUTPATIENT
Start: 2024-11-25 | End: 2025-05-24

## 2024-11-25 NOTE — ASSESSMENT & PLAN NOTE
Triglycerides are slightly above goal (152) on Vascaepa 2g BID. Labs slightly worsened from last check in December 2023. Patient notes previous therapy interruption due to cost. Admits some poor adherence this year. Patient educated on importance of adherence to regimen.   LFTs are WNL   Lab Results   Component Value Date    ALT 24 11/23/2024    AST 17 11/23/2024    ALKPHOS 75 11/23/2024    BILITOT 0.6 11/23/2024     Medication Changes:  CONTINUE  Vascepa 2g BID  Atorvastatin 40mg daily

## 2024-11-25 NOTE — PROGRESS NOTES
Patient ID: Kaity Eric is a 69 y.o. female who presents for Diabetes and Hyperlipidemia    Referring Provider: Mei Taylor DO  PCP: Mei Taylor DO   Last visit with PCP: 5/29/2024 Next visit with PCP: 12/3/2024    Endocrinologist: Dr. Osman Cr (next visit 3/5/2024)    Subjective     Adherence: Admits she missed pills 5 of last 10 days. Thought she needed to take all medications with food. Advised patient that she may trial taking her medicines without food. If she experiences diarrhea, should go back to taking metformin with food.    Diabetes    Current Pharmacotherapy:   Metformin XL 2000 mg daily  Pioglitazone 45 mg daily  Mounjaro 10 mg weekly (Mondays, fourth dose today)  BRIANNA:   Indigestion leading to vomiting. 7 episodes of vomiting in the last month, but none in the last week. Thinks her body might be getting used to Mounjaro. Tums helps. Switched from coffee to tea. Learning to stop eating when she feels full. Took prilosec ~15 years ago.  Injection site reaction (redness, swelling, itching) lasting 2 days after injection. Resolves with hydocortisone 2.5mg cream.  Weight loss: Patient reports weight ~204 lb currently. (228 lb when starting Mounjaro, 212 last visit)      Note: previously tried Jardiance 25mg daily but experienced yeast infections. Awful- worst yeast infections whole time on it.   Discontinued: glimepiride     Diet  Encouraged patient to increase consumption of protein and vegetable and decrease consumption of carbohydrates. Patient working to eat 80g protein in a day. Tried protein shakes and powder- not palatable. Not hungry in the morning. Recommended she may try low sugar greek yogurt, protein-enriched oatmeal, [place] cakes.    Exercise  Hairline fracture in foot has healed. Walking more.    SECONDARY PREVENTION  - Statin? Atorvastatin 40 mg   - ACE-I/ARB? Lisinopril 20 mg   - Aspirin? No    -The ASCVD Risk score (Clay MCDERMOTT, et al., 2019) failed to calculate for the  following reasons:    The valid total cholesterol range is 130 to 320 mg/dL      Current monitoring regimen:   Patient is using: glucometer     SMBG Fasting Readings from 11/25: No pills (5 days/10) 119, 131, 144, 156, 176, 173, 144, 167, 168, 137, 138.   From 10/28: couple days missed. Guests from out of town. 149, 174, 127, 129   From 9/23: 124, 108, 108, 161, 93, 116.   From 9/11: 166 208 196 147 135 184   8/1/24: 132, 169, 174, 144, 119, 131, 157, 166, 176, 159, 190, 90. 150, 183.   SMBG:   Noted that when readings are very high, she usually skipped her pills the day before     Pertinent PMH Review:  - PMH of Pancreatitis: no  - PMH/FH of Medullary Thyroid Cancer: no  - PMH of Retinopathy: no      VAF Application    Medication(s): Mounjaro, Vascepa, *Add Farxiga 11/25    Application response: [Approved]    Approved until: 6/28/2025    Additional information:  - Patient will get this prescription mailed to them from  pharmacies to receive medication at no cost  - Patient will need to re-enroll in this program prior to expiration date to maintain coverage    Review of Systems      Objective     LMP  (LMP Unknown)    BP Readings from Last 4 Encounters:   10/01/24 120/74   07/12/24 136/74   05/29/24 124/82   05/02/24 128/66      There were no vitals filed for this visit.     Weight 7/12/24 228 lb ---> 10/1/24: 218 lb.     Labs  Lab Results   Component Value Date    BILITOT 0.6 11/23/2024    CALCIUM 9.2 11/23/2024    CO2 28 11/23/2024     11/23/2024    CREATININE 0.83 11/23/2024    GLUCOSE 158 (H) 11/23/2024    ALKPHOS 75 11/23/2024    K 3.9 11/23/2024    PROT 6.5 11/23/2024     11/23/2024    AST 17 11/23/2024    ALT 24 11/23/2024    BUN 9 11/23/2024    ANIONGAP 14 11/23/2024    ALBUMIN 4.0 11/23/2024    GFRF 75 08/22/2023     Lab Results   Component Value Date    TRIG 152 (H) 09/26/2024    CHOL 105 09/26/2024    LDLCALC 36 09/26/2024    HDL 38.3 09/26/2024     Lab Results   Component Value Date     HGBA1C 6.3 (H) 09/26/2024       Current Outpatient Medications   Medication Instructions    atorvastatin (LIPITOR) 40 mg, oral, Daily    cyanocobalamin (VITAMIN B-12) 2,000 mcg, oral, Daily    dapagliflozin propanediol (FARXIGA) 5 mg, oral, Daily    ferrous sulfate (325 mg ferrous sulfate) 325 mg, oral, 3 times weekly    hydrocortisone 2.5 % cream Topical, 2 times daily PRN    icosapent ethyL (VASCEPA) 2 g, oral, 2 times daily (morning and late afternoon)    lisinopriL-hydrochlorothiazide 20-25 mg tablet 1 tablet, oral, Daily    metFORMIN XR (GLUCOPHAGE-XR) 2,000 mg, oral, Daily    Mounjaro 10 mg, subcutaneous, Weekly    omeprazole (PRILOSEC) 20 mg, oral, Daily, Do not crush or chew.    OneTouch Ultra Test strip Use to check blood sugar up to three times daily as directed    venlafaxine XR (EFFEXOR-XR) 75 mg, oral, Daily, Take with food.         Drug Interactions;  None at time of review    Assessment/Plan   Problem List Items Addressed This Visit       Hypertriglyceridemia     Triglycerides are slightly above goal (152) on V[place]a 2g BID. Labs slightly worsened from last check in December 2023. Patient notes previous therapy interruption due to cost. Admits some poor adherence this year. Patient educated on importance of adherence to regimen.   LFTs are WNL   Lab Results   Component Value Date    ALT 24 11/23/2024    AST 17 11/23/2024    ALKPHOS 75 11/23/2024    BILITOT 0.6 11/23/2024     Medication Changes:  CONTINUE  Vascepa 2g BID  Atorvastatin 40mg daily         Relevant Medications    icosapent ethyL (Vascepa) 1 gram capsule    Other Relevant Orders    Referral to Clinical Pharmacy    Type 2 diabetes mellitus without complication, without long-term current use of insulin (Multi)     Patient's diabetes is currently controlled, as shown by most recent A1c of 6.3% (goal <7%). SMBG are at goal. Most recent A1c greatly improved (8.8%-->6.3%). Hypoglycemia concerns resolved since stopping glimepiride.  Patient  experiencing increased side effects on Mounjaro 10mg including nausea, vomiting, and injection site reaction. Has been adjusting eating habits and has not experienced indigestion or vomiting for the last week. Injection site itching manageable with hydrocortisone 2.5%. Patient is resistant to decreasing Mounjaro dose. Would prefer to increase dose today. Agreeable to staying at current dose and working on side effect management. Will send 8 week trial of PPI and hydrocortisone 2.5% cream.    Discussed transition from pioglitazone to SGLT2i. Previously experienced yeast infections with Jardiance 25mg daily. Explained that she may tolerate SGLT2i better now that blood sugars are better controlled. Patient willing to retry SGLT2i therapy at low dose with Farxiga 5mg daily. Interested in CV, renal benefits and potential for additional weight loss. Will call me with any side effects.    Discussed importance of adherence to oral medications. Missed oral medicines on 5 of last 10 days. Advised that she may try taking her medicines without food. If she does not experience side effects, may continue to take all medicines without food to improve adherence.   Plan:  START  Farxiga 5mg daily  STOP  Pioglitazone 45mg daily  CONTINUE   Metformin XL 2000 mg daily  Mounjaro 10mg once weekly    Monitoring and Education  Improve adherence to oral medications  Educated on MOA, side effects of Farxiga. Patient has no questions at this time.   Educated on increasing protein consumption and decreasing consumption of carbs         Relevant Medications    dapagliflozin propanediol (Farxiga) 5 mg    tirzepatide (Mounjaro) 10 mg/0.5 mL pen injector    omeprazole (PriLOSEC) 20 mg DR capsule    Other Relevant Orders    Referral to Clinical Pharmacy    Personal history of diseases of the skin and subcutaneous tissue - Primary    Relevant Medications    hydrocortisone 2.5 % cream     Omeprazole 20mg once daily for 8 weeks for management of  indigestion related to GLP1 therapy. Consider decreasing dose of Mounjaro if GERD symptoms still present after 8 weeks.   Hydrocortisone 2.5% cream applied to Mounjaro injection site once weekly as needed for itching.      Follow-up: 12/16 at 9:00.     Time spent with pt: Total length of time 30(minutes) of the encounter and more than 50% was spent counseling the patient.    Joceline Sepulveda, PharmD    Continue all meds under the continuation of care with the referring provider and clinical pharmacy team.

## 2024-11-25 NOTE — ASSESSMENT & PLAN NOTE
Patient's diabetes is currently controlled, as shown by most recent A1c of 6.3% (goal <7%). SMBG are at goal. Most recent A1c greatly improved (8.8%-->6.3%). Hypoglycemia concerns resolved since stopping glimepiride.  Patient experiencing increased side effects on Mounjaro 10mg including nausea, vomiting, and injection site reaction. Has been adjusting eating habits and has not experienced indigestion or vomiting for the last week. Injection site itching manageable with hydrocortisone 2.5%. Patient is resistant to decreasing Mounjaro dose. Would prefer to increase dose today. Agreeable to staying at current dose and working on side effect management. Will send 8 week trial of PPI and hydrocortisone 2.5% cream.    Discussed transition from pioglitazone to SGLT2i. Previously experienced yeast infections with Jardiance 25mg daily. Explained that she may tolerate SGLT2i better now that blood sugars are better controlled. Patient willing to retry SGLT2i therapy at low dose with Farxiga 5mg daily. Interested in CV, renal benefits and potential for additional weight loss. Will call me with any side effects.    Discussed importance of adherence to oral medications. Missed oral medicines on 5 of last 10 days. Advised that she may try taking her medicines without food. If she does not experience side effects, may continue to take all medicines without food to improve adherence.   Plan:  START  Farxiga 5mg daily  STOP  Pioglitazone 45mg daily  CONTINUE   Metformin XL 2000 mg daily  Mounjaro 10mg once weekly    Monitoring and Education  Improve adherence to oral medications  Educated on MOA, side effects of Farxiga. Patient has no questions at this time.   Educated on increasing protein consumption and decreasing consumption of carbs

## 2024-11-26 ENCOUNTER — PHARMACY VISIT (OUTPATIENT)
Dept: PHARMACY | Facility: CLINIC | Age: 69
End: 2024-11-26
Payer: COMMERCIAL

## 2024-11-27 ENCOUNTER — PHARMACY VISIT (OUTPATIENT)
Dept: PHARMACY | Facility: CLINIC | Age: 69
End: 2024-11-27
Payer: COMMERCIAL

## 2024-12-03 ENCOUNTER — APPOINTMENT (OUTPATIENT)
Dept: PRIMARY CARE | Facility: CLINIC | Age: 69
End: 2024-12-03
Payer: MEDICARE

## 2024-12-12 ENCOUNTER — TELEMEDICINE (OUTPATIENT)
Dept: PHARMACY | Facility: HOSPITAL | Age: 69
End: 2024-12-12
Payer: MEDICARE

## 2024-12-12 DIAGNOSIS — E11.9 TYPE 2 DIABETES MELLITUS WITHOUT COMPLICATION, WITHOUT LONG-TERM CURRENT USE OF INSULIN (MULTI): ICD-10-CM

## 2024-12-12 DIAGNOSIS — E78.1 HYPERTRIGLYCERIDEMIA: ICD-10-CM

## 2024-12-16 ENCOUNTER — APPOINTMENT (OUTPATIENT)
Dept: PHARMACY | Facility: HOSPITAL | Age: 69
End: 2024-12-16
Payer: MEDICARE

## 2024-12-16 ENCOUNTER — OFFICE VISIT (OUTPATIENT)
Dept: PRIMARY CARE | Facility: CLINIC | Age: 69
End: 2024-12-16
Payer: MEDICARE

## 2024-12-16 VITALS
SYSTOLIC BLOOD PRESSURE: 110 MMHG | WEIGHT: 195.2 LBS | HEART RATE: 74 BPM | HEIGHT: 65 IN | TEMPERATURE: 96.7 F | BODY MASS INDEX: 32.52 KG/M2 | DIASTOLIC BLOOD PRESSURE: 66 MMHG

## 2024-12-16 DIAGNOSIS — E11.9 TYPE 2 DIABETES MELLITUS WITHOUT COMPLICATION, WITHOUT LONG-TERM CURRENT USE OF INSULIN (MULTI): ICD-10-CM

## 2024-12-16 DIAGNOSIS — Z87.2 PERSONAL HISTORY OF DISEASES OF THE SKIN AND SUBCUTANEOUS TISSUE: ICD-10-CM

## 2024-12-16 DIAGNOSIS — N95.1 MENOPAUSE SYNDROME: ICD-10-CM

## 2024-12-16 DIAGNOSIS — Z78.9 FULL CODE STATUS: ICD-10-CM

## 2024-12-16 DIAGNOSIS — H61.23 BILATERAL IMPACTED CERUMEN: ICD-10-CM

## 2024-12-16 DIAGNOSIS — J84.10 PULMONARY FIBROSIS, UNSPECIFIED (MULTI): ICD-10-CM

## 2024-12-16 DIAGNOSIS — Z00.00 ROUTINE GENERAL MEDICAL EXAMINATION AT HEALTH CARE FACILITY: Primary | ICD-10-CM

## 2024-12-16 DIAGNOSIS — Z12.31 ENCOUNTER FOR SCREENING MAMMOGRAM FOR MALIGNANT NEOPLASM OF BREAST: ICD-10-CM

## 2024-12-16 DIAGNOSIS — I73.9 PVD (PERIPHERAL VASCULAR DISEASE) (CMS-HCC): ICD-10-CM

## 2024-12-16 DIAGNOSIS — E66.01 OBESITY, MORBID (MULTI): ICD-10-CM

## 2024-12-16 PROCEDURE — 3044F HG A1C LEVEL LT 7.0%: CPT | Performed by: FAMILY MEDICINE

## 2024-12-16 PROCEDURE — 1123F ACP DISCUSS/DSCN MKR DOCD: CPT | Performed by: FAMILY MEDICINE

## 2024-12-16 PROCEDURE — 1159F MED LIST DOCD IN RCRD: CPT | Performed by: FAMILY MEDICINE

## 2024-12-16 PROCEDURE — G0439 PPPS, SUBSEQ VISIT: HCPCS | Performed by: FAMILY MEDICINE

## 2024-12-16 PROCEDURE — G0446 INTENS BEHAVE THER CARDIO DX: HCPCS | Performed by: FAMILY MEDICINE

## 2024-12-16 PROCEDURE — 3078F DIAST BP <80 MM HG: CPT | Performed by: FAMILY MEDICINE

## 2024-12-16 PROCEDURE — 3048F LDL-C <100 MG/DL: CPT | Performed by: FAMILY MEDICINE

## 2024-12-16 PROCEDURE — 3061F NEG MICROALBUMINURIA REV: CPT | Performed by: FAMILY MEDICINE

## 2024-12-16 PROCEDURE — 3074F SYST BP LT 130 MM HG: CPT | Performed by: FAMILY MEDICINE

## 2024-12-16 PROCEDURE — 1036F TOBACCO NON-USER: CPT | Performed by: FAMILY MEDICINE

## 2024-12-16 PROCEDURE — 3008F BODY MASS INDEX DOCD: CPT | Performed by: FAMILY MEDICINE

## 2024-12-16 PROCEDURE — 69210 REMOVE IMPACTED EAR WAX UNI: CPT | Performed by: FAMILY MEDICINE

## 2024-12-16 PROCEDURE — 1170F FXNL STATUS ASSESSED: CPT | Performed by: FAMILY MEDICINE

## 2024-12-16 PROCEDURE — 99214 OFFICE O/P EST MOD 30 MIN: CPT | Performed by: FAMILY MEDICINE

## 2024-12-16 PROCEDURE — 1158F ADVNC CARE PLAN TLK DOCD: CPT | Performed by: FAMILY MEDICINE

## 2024-12-16 PROCEDURE — 1160F RVW MEDS BY RX/DR IN RCRD: CPT | Performed by: FAMILY MEDICINE

## 2024-12-16 RX ORDER — VENLAFAXINE HYDROCHLORIDE 75 MG/1
75 CAPSULE, EXTENDED RELEASE ORAL DAILY
Qty: 90 CAPSULE | Refills: 1 | Status: SHIPPED | OUTPATIENT
Start: 2024-12-16 | End: 2025-06-14

## 2024-12-16 RX ORDER — HYDROCORTISONE 25 MG/G
CREAM TOPICAL 2 TIMES DAILY PRN
Qty: 28 G | Refills: 0 | Status: SHIPPED | OUTPATIENT
Start: 2024-12-16 | End: 2025-01-15

## 2024-12-16 RX ORDER — PIOGLITAZONEHYDROCHLORIDE 45 MG/1
45 TABLET ORAL DAILY
Qty: 30 TABLET | Refills: 0 | Status: SHIPPED | OUTPATIENT
Start: 2024-12-16 | End: 2025-01-15

## 2024-12-16 ASSESSMENT — ENCOUNTER SYMPTOMS
DYSURIA: 0
SHORTNESS OF BREATH: 0
ABDOMINAL PAIN: 0
PALPITATIONS: 0
HEADACHES: 0
MYALGIAS: 0
POLYPHAGIA: 0
CONSTIPATION: 0
BLOOD IN STOOL: 0
FATIGUE: 0
DIARRHEA: 0
DIZZINESS: 0
DIFFICULTY URINATING: 0
DYSPHORIC MOOD: 0
ARTHRALGIAS: 0
ABDOMINAL DISTENTION: 0
POLYDIPSIA: 0
SLEEP DISTURBANCE: 0

## 2024-12-16 ASSESSMENT — ACTIVITIES OF DAILY LIVING (ADL)
DRESSING: INDEPENDENT
BATHING: INDEPENDENT
DRESSING: INDEPENDENT
MANAGING_FINANCES: INDEPENDENT
DOING_HOUSEWORK: INDEPENDENT
GROCERY_SHOPPING: INDEPENDENT
TAKING_MEDICATION: INDEPENDENT
BATHING: INDEPENDENT

## 2024-12-16 ASSESSMENT — PATIENT HEALTH QUESTIONNAIRE - PHQ9
2. FEELING DOWN, DEPRESSED OR HOPELESS: NOT AT ALL
1. LITTLE INTEREST OR PLEASURE IN DOING THINGS: NOT AT ALL
SUM OF ALL RESPONSES TO PHQ9 QUESTIONS 1 AND 2: 0

## 2024-12-16 NOTE — PATIENT INSTRUCTIONS
Recommend a predominant low fat whole foods plant based diet.  Cut back on meat, dairy, processed carbs, salt and oils(especially palm and coconut). Increase fiber in your diet.  Decrease alcohol as much as possible if you drink. Recommend regular exercise most days of the week(goal up to 150min per week). Also recommend good sleep habits aiming for 7-8 hours per night.     Follow up with your specialists as scheduled    Please bring in copies of your advance directives to your next appointment    Continue your current meds but recommend decrease mounjaro to 7.5mg.     Check your records for pneumovax    You were referred for mammogram    Return in 6 months, sooner if needed

## 2024-12-16 NOTE — ASSESSMENT & PLAN NOTE
Patient's diabetes is currently controlled, as shown by most recent A1c of 6.3% (goal <7%). SMBG are at goal. Most recent A1c greatly improved (8.8%-->6.3%). Hypoglycemia concerns resolved since stopping glimepiride.  Patient continues to experience regular nausea and vomiting on Mounjaro 10mg weekly. Trialed omeprazole without relief. Is happy with weight loss (38 pounds since starting Mounjaro) and does not want to restart dose titration. Agreeable to taking two weeks off and then reducing dose to 5mg, previously tolerated by patient. Will work back up as tolerated.    Farxiga 5mg tolerated well to this point. Will continue to look out for side effects. Consider increasing dose once patient feels better from Mounjaro side effects.      Plan:  CONTINUE   Metformin XL 2000 mg daily  Farxiga 5mg daily  DECREASE  Mounjaro 5mg once weekly  OK to take two weeks off of Mounjaro therapy and resume 12/31/24  May take pioglitazone 45mg in the week without Mounjaro     Monitoring and Education  Improve adherence to oral medications  Educated on MOA, side effects of Farxiga. Patient has no questions at this time.   Educated on increasing protein consumption and decreasing consumption of carbs

## 2024-12-16 NOTE — PROGRESS NOTES
"Subjective   Reason for Visit: Kaity Eric is an 69 y.o. female here for a Medicare Wellness visit and multiple issues.     Past Medical, Surgical, and Family History reviewed and updated in chart.    Reviewed all medications by prescribing practitioner or clinical pharmacist (such as prescriptions, OTCs, herbal therapies and supplements) and documented in the medical record.    HPI    Patient Care Team:  Mei Taylor DO as PCP - General (Family Medicine)  [facility]   Endo: Dr. Cr  Cardiology: Dr. Tahira Salgado    Mood: controlled.   BMI: High. Trying to work on diet. Wt cont to dec. Lost 37 lbs since July and 13 lbs since oct.   DM: controlled. A1c 6.3(8.8). seeing endo. Also seeing clinical pharmacist. On mounjaro but having some N/V.   HTN: controlled.   PVD: stable. On statin  PF: inactive. Released by pulm  Cerumen: b/l. Some pressure in ears.     Pain: denies  Review of Systems   Constitutional:  Negative for fatigue.   HENT: Negative.     Eyes:  Negative for visual disturbance.   Respiratory:  Negative for shortness of breath.    Cardiovascular:  Negative for chest pain and palpitations.   Gastrointestinal:  Negative for abdominal distention, abdominal pain, blood in stool, constipation and diarrhea.   Endocrine: Negative for cold intolerance, heat intolerance, polydipsia, polyphagia and polyuria.   Genitourinary:  Negative for difficulty urinating and dysuria.   Musculoskeletal:  Negative for arthralgias and myalgias.   Skin:  Negative for rash.   Neurological:  Negative for dizziness and headaches.   Psychiatric/Behavioral:  Negative for dysphoric mood and sleep disturbance.        Objective   Vitals:  /66   Pulse 74   Temp 35.9 °C (96.7 °F)   Ht 1.651 m (5' 5\")   Wt 88.5 kg (195 lb 3.2 oz)   LMP  (LMP Unknown)   BMI 32.48 kg/m²       Physical Exam  Vitals and nursing note reviewed.   Constitutional:       General: She is not " in acute distress.     Appearance: Normal appearance. She is not toxic-appearing.   HENT:      Head: Normocephalic.      Right Ear: There is impacted cerumen.      Left Ear: There is impacted cerumen.      Ears:      Comments: AC clear after lavage and b/l TM's nml     Nose: Nose normal.      Mouth/Throat:      Pharynx: Oropharynx is clear.   Eyes:      General: No scleral icterus.     Pupils: Pupils are equal, round, and reactive to light.   Neck:      Vascular: No carotid bruit.   Cardiovascular:      Rate and Rhythm: Normal rate and regular rhythm.      Heart sounds: No murmur heard.  Pulmonary:      Effort: Pulmonary effort is normal. No respiratory distress.      Breath sounds: Normal breath sounds.   Abdominal:      Palpations: Abdomen is soft.      Tenderness: There is no abdominal tenderness. There is no guarding.   Musculoskeletal:         General: No tenderness.      Cervical back: Neck supple.      Right lower leg: No edema.      Left lower leg: No edema.   Lymphadenopathy:      Cervical: No cervical adenopathy.   Skin:     General: Skin is warm.   Neurological:      General: No focal deficit present.      Mental Status: She is alert.      Cranial Nerves: No cranial nerve deficit.   Psychiatric:         Mood and Affect: Mood normal.         Assessment & Plan  Menopause syndrome    Orders:    venlafaxine XR (Effexor-XR) 75 mg 24 hr capsule; Take 1 capsule (75 mg) by mouth once daily. Take with food.    Personal history of diseases of the skin and subcutaneous tissue    Orders:    hydrocortisone 2.5 % cream; Apply topically 2 times a day as needed for irritation or rash.    Routine general medical examination at health care facility         Obesity, morbid (Multi)         BMI 31.0-31.9,adult         Pulmonary fibrosis, unspecified (Multi)         PVD (peripheral vascular disease) (CMS-[facility])         Encounter for screening mammogram for malignant neoplasm of breast    Orders:    BI mammo bilateral screening  tomosynthesis; Future    Type 2 diabetes mellitus without complication, without long-term current use of insulin (Multi)    Orders:    Hemoglobin A1C; Future    Full code status    Orders:    Full code    Bilateral impacted cerumen          Discussed blood work and wellness issues. Reviewed screenings and immunizations. Recommendations given. Pt to check records for pneumovax. She may have received at pharm    ASCVD risk counseling:  discussed ASCVD risk.  Aspirin not indicated at this time. Lifestyle modifications including nutritional choices, exercise and trying to eliminate habits contributing to risk were discussed. Patient agreeable to make efforts to continue to control their current cardiovascular risk factors. On statin

## 2024-12-16 NOTE — PROGRESS NOTES
Patient ID: Kaity Eric is a 69 y.o. female who presents for Diabetes and Hyperlipidemia    Referring Provider: Mei Taylor DO  PCP: Mei Taylor DO   Last visit with PCP: 5/29/2024 Next visit with PCP: 12/3/2024    Endocrinologist: Dr. Osman Cr (next visit 3/5/2024)    Subjective   Patient called with reports of worsening side effects from Mounjaro. Has been vomiting 2-3 times per week since increasing Mounjaro to 10mg over 2 months ago. Is unable to keep down oral pills. Only able to eat a few bites of bread and some blueberries each day. Vomiting triggered from food or water. Omeprazole not helping.    Diabetes    Current Pharmacotherapy:   Metformin XL 2000 mg daily  Dapagliflozin 5mg daily  Mounjaro 10 mg weekly  BRIANNA:   Vomiting 2-3 days/week  Injection site reaction (redness, swelling, itching): improved with hydrocortisone cream  Weight loss: Patient reports weight ~190 lb currently. (228 lb when starting Mounjaro, 212 last visit)    Note: previously tried Jardiance 25mg daily but experienced yeast infections. Awful- worst yeast infections whole time on it.   Discontinued: glimepiride     Diet  Encouraged patient to increase consumption of protein and vegetable and decrease consumption of carbohydrates. Patient working to eat 80g protein in a day. Tried protein shakes and powder- not palatable. Not hungry in the morning. Recommended she may try low sugar greek yogurt, protein-enriched oatmeal, [place] cakes.    Exercise  Hairline fracture in foot has healed. Walking more.    SECONDARY PREVENTION  - Statin? Atorvastatin 40 mg   - ACE-I/ARB? Lisinopril 20 mg   - Aspirin? No    -The ASCVD Risk score (Clay DK, et al., 2019) failed to calculate for the following reasons:    The valid total cholesterol range is 130 to 320 mg/dL      Current monitoring regimen:   Patient is using: glucometer     SMBG Fasting Readings from   12/12: Am: 148, 112, 116, 146, 151, 141, 158, 214, 175, 123  From  11/25: No pills (5 days/10) 119, 131, 144, 156, 176, 173, 144, 167, 168, 137, 138.   From 10/28: couple days missed. Guests from out of town. 149, 174, 127, 129   From 9/23: 124, 108, 108, 161, 93, 116.   From 9/11: 166 208 196 147 135 184   8/1/24: 132, 169, 174, 144, 119, 131, 157, 166, 176, 159, 190, 90. 150, 183.   SMBG:   Noted that when readings are very high, she usually skipped her pills the day before     Pertinent PMH Review:  - PMH of Pancreatitis: no  - PMH/FH of Medullary Thyroid Cancer: no  - PMH of Retinopathy: no      VAF Application    Medication(s): Mounjaro, Vascepa, *Add Farxiga 11/25    Application response: [Approved]    Approved until: 6/28/2025    Additional information:  - Patient will get this prescription mailed to them from  pharmacies to receive medication at no cost  - Patient will need to re-enroll in this program prior to expiration date to maintain coverage    Review of Systems      Objective     LMP  (LMP Unknown)    BP Readings from Last 4 Encounters:   12/16/24 110/66   10/01/24 120/74   07/12/24 136/74   05/29/24 124/82      There were no vitals filed for this visit.     Weight 7/12/24 228 lb ---> 10/1/24: 218 lb.     Labs  Lab Results   Component Value Date    BILITOT 0.6 11/23/2024    CALCIUM 9.2 11/23/2024    CO2 28 11/23/2024     11/23/2024    CREATININE 0.83 11/23/2024    GLUCOSE 158 (H) 11/23/2024    ALKPHOS 75 11/23/2024    K 3.9 11/23/2024    PROT 6.5 11/23/2024     11/23/2024    AST 17 11/23/2024    ALT 24 11/23/2024    BUN 9 11/23/2024    ANIONGAP 14 11/23/2024    ALBUMIN 4.0 11/23/2024    GFRF 75 08/22/2023     Lab Results   Component Value Date    TRIG 152 (H) 09/26/2024    CHOL 105 09/26/2024    LDLCALC 36 09/26/2024    HDL 38.3 09/26/2024     Lab Results   Component Value Date    HGBA1C 6.3 (H) 09/26/2024       Current Outpatient Medications   Medication Instructions    atorvastatin (LIPITOR) 40 mg, oral, Daily    cyanocobalamin (VITAMIN B-12) 2,000  mcg, Daily    Farxiga 5 mg, oral, Daily    ferrous sulfate (325 mg ferrous sulfate) 325 mg, 3 times weekly    hydrocortisone 2.5 % cream Topical, 2 times daily PRN    icosapent ethyL (VASCEPA) 2 g, oral, 2 times daily (morning and late afternoon)    lisinopriL-hydrochlorothiazide 20-25 mg tablet 1 tablet, oral, Daily    metFORMIN XR (GLUCOPHAGE-XR) 2,000 mg, oral, Daily    Mounjaro 10 mg, subcutaneous, Weekly    OneTouch Ultra Test strip Use to check blood sugar up to three times daily as directed    pioglitazone (ACTOS) 45 mg, oral, Daily    venlafaxine XR (EFFEXOR-XR) 75 mg, oral, Daily, Take with food.         Drug Interactions;  None at time of review    Assessment/Plan   Problem List Items Addressed This Visit       Hypertriglyceridemia    Type 2 diabetes mellitus without complication, without long-term current use of insulin (Multi)     Patient's diabetes is currently controlled, as shown by most recent A1c of 6.3% (goal <7%). SMBG are at goal. Most recent A1c greatly improved (8.8%-->6.3%). Hypoglycemia concerns resolved since stopping glimepiride.  Patient continues to experience regular nausea and vomiting on Mounjaro 10mg weekly. Trialed omeprazole without relief. Is happy with weight loss (38 pounds since starting Mounjaro) and does not want to restart dose titration. Agreeable to taking two weeks off and then reducing dose to 5mg, previously tolerated by patient. Will work back up as tolerated.    Farxiga 5mg tolerated well to this point. Will continue to look out for side effects. Consider increasing dose once patient feels better from Mounjaro side effects.      Plan:  CONTINUE   Metformin XL 2000 mg daily  Farxiga 5mg daily  DECREASE  Mounjaro 5mg once weekly  OK to take two weeks off of Mounjaro therapy and resume 12/31/24  May take pioglitazone 45mg in the week without Mounjaro     Monitoring and Education  Improve adherence to oral medications  Educated on MOA, side effects of Farxiga. Patient has  no questions at this time.   Educated on increasing protein consumption and decreasing consumption of carbs         Relevant Medications    pioglitazone (Actos) 45 mg tablet     Hydrocortisone 2.5% cream applied to Mounjaro injection site once weekly as needed for itching.      Follow-up: 1/13/25 at 9:00am     Time spent with pt: Total length of time 30(minutes) of the encounter and more than 50% was spent counseling the patient.    Joceline Sepulveda, PharmD    Continue all meds under the continuation of care with the referring provider and clinical pharmacy team.

## 2024-12-16 NOTE — ASSESSMENT & PLAN NOTE
Orders:    hydrocortisone 2.5 % cream; Apply topically 2 times a day as needed for irritation or rash.

## 2024-12-16 NOTE — ASSESSMENT & PLAN NOTE
Orders:    venlafaxine XR (Effexor-XR) 75 mg 24 hr capsule; Take 1 capsule (75 mg) by mouth once daily. Take with food.

## 2024-12-18 PROCEDURE — RXMED WILLOW AMBULATORY MEDICATION CHARGE

## 2024-12-18 RX ORDER — TIRZEPATIDE 5 MG/.5ML
5 INJECTION, SOLUTION SUBCUTANEOUS WEEKLY
Qty: 2 ML | Refills: 0 | Status: SHIPPED | OUTPATIENT
Start: 2024-12-30

## 2024-12-19 ENCOUNTER — PHARMACY VISIT (OUTPATIENT)
Dept: PHARMACY | Facility: CLINIC | Age: 69
End: 2024-12-19
Payer: COMMERCIAL

## 2024-12-21 ENCOUNTER — OFFICE VISIT (OUTPATIENT)
Dept: URGENT CARE | Age: 69
End: 2024-12-21
Payer: MEDICARE

## 2024-12-21 VITALS
TEMPERATURE: 96.3 F | HEART RATE: 94 BPM | SYSTOLIC BLOOD PRESSURE: 126 MMHG | RESPIRATION RATE: 16 BRPM | OXYGEN SATURATION: 99 % | DIASTOLIC BLOOD PRESSURE: 75 MMHG

## 2024-12-21 DIAGNOSIS — R10.9 FLANK PAIN, ACUTE: ICD-10-CM

## 2024-12-21 DIAGNOSIS — M54.9 BACK PAIN WITH RADIATION: Primary | ICD-10-CM

## 2024-12-21 LAB
POC APPEARANCE, URINE: CLEAR
POC BILIRUBIN, URINE: ABNORMAL
POC BLOOD, URINE: NEGATIVE
POC COLOR, URINE: ABNORMAL
POC GLUCOSE, URINE: ABNORMAL MG/DL
POC KETONES, URINE: NEGATIVE MG/DL
POC LEUKOCYTES, URINE: NEGATIVE
POC NITRITE,URINE: NEGATIVE
POC PH, URINE: 6 PH
POC PROTEIN, URINE: ABNORMAL MG/DL
POC SPECIFIC GRAVITY, URINE: 1.02
POC UROBILINOGEN, URINE: 0.2 EU/DL

## 2024-12-21 PROCEDURE — 87086 URINE CULTURE/COLONY COUNT: CPT

## 2024-12-21 RX ORDER — CYCLOBENZAPRINE HCL 10 MG
10 TABLET ORAL 2 TIMES DAILY PRN
Qty: 14 TABLET | Refills: 0 | Status: SHIPPED | OUTPATIENT
Start: 2024-12-21 | End: 2024-12-28

## 2024-12-21 ASSESSMENT — PAIN SCALES - GENERAL: PAINLEVEL_OUTOF10: 9

## 2024-12-21 NOTE — PROGRESS NOTES
"Subjective   History of Present Illness: Kaity Eric is a 69 y.o. female. They present today with a chief complaint of Back Pain (C/O thoracic back pain which radiates to R flank and is described as sharp and has been going on for a week. /Pain in wosre when she breathes in and or she twist her back. ).          Past Medical History  Allergies as of 12/21/2024 - Reviewed 12/21/2024   Allergen Reaction Noted    Jardiance [empagliflozin] Other 01/11/2024    Latex Hives and Swelling 08/21/2012    Red dye Swelling 04/27/2023       (Not in a hospital admission)       Past Medical History:   Diagnosis Date    CN (constipation) 10/15/2008    Combined forms of age-related cataract of left eye 11/06/2020    Last Assessment & Plan: Formatting of this note might be different from the original. - OS cataract is visually significant and is causing patients symptoms. - Patient is also complaining of symptoms of anisometropia. - Patient trialed multiple CL prescriptions, thinks she may like -4.25 but still with symptoms of cataract. - Will trial patient with -4.25 CL (prescription given). If patient can to    Dry eye syndrome of both eyes 11/06/2020    Last Assessment & Plan: Formatting of this note might be different from the original. - Advised patient to use artificial tears 4 times a day in both eyes. Told to avoid Visine, Clear Eyes, or \"redness relief\" drops. Last Assessment & Plan: Formatting of this note might be different from the original. - Advised patient to use artificial tears 4 times a day in both eyes. Told to avoid Visine, Clear    History of phacoemulsification of cataract with intraocular lens implantation, right 01/08/2021    Formatting of this note might be different from the original. - 1/7/21 Luis M/Lisa SA6AT3 +15.0 PCIOL, topical, Viscoat, Miostat, Diamox 500mg post-op due to posterior pressure. Last Assessment & Plan: Formatting of this note might be different from the original. - 1 month post-op, " doing well. - Patient 20/30, BCVA 20/20. - Toric lens centered and aligned appropriately, no astigmatism with presc    Personal history of diseases of the skin and subcutaneous tissue     History of psoriasis    Personal history of diseases of the skin and subcutaneous tissue     History of rosacea       Past Surgical History:   Procedure Laterality Date    OTHER SURGICAL HISTORY  07/12/2019    Tonsillectomy    OTHER SURGICAL HISTORY  10/13/2020    Colonoscopy        reports that she has never smoked. She has never used smokeless tobacco. She reports that she does not currently use alcohol. She reports that she does not use drugs.    Review of Systems  Review of Systems                               Objective    Vitals:    12/21/24 0838   BP: 126/75   BP Location: Left arm   Patient Position: Standing   BP Cuff Size: Adult   Pulse: 94   Resp: 16   Temp: 35.7 °C (96.3 °F)   TempSrc: Oral   SpO2: 99%     No LMP recorded (lmp unknown). Patient is postmenopausal.    Physical Exam  Vitals reviewed.   HENT:      Head: Normocephalic and atraumatic.      Nose: Nose normal.      Mouth/Throat:      Mouth: Mucous membranes are moist.   Eyes:      Extraocular Movements: Extraocular movements intact.      Conjunctiva/sclera: Conjunctivae normal.      Pupils: Pupils are equal, round, and reactive to light.   Cardiovascular:      Rate and Rhythm: Normal rate and regular rhythm.   Pulmonary:      Effort: Pulmonary effort is normal.      Breath sounds: Normal breath sounds.   Abdominal:      Palpations: Abdomen is soft.      Tenderness: There is no abdominal tenderness. There is no right CVA tenderness or left CVA tenderness.   Musculoskeletal:      Cervical back: Normal.      Thoracic back: Normal.      Lumbar back: Normal.   Skin:     General: Skin is warm.   Neurological:      Mental Status: She is alert and oriented to person, place, and time.   Psychiatric:         Mood and Affect: Mood normal.         Behavior: Behavior  normal.             Point of Care Test & Imaging Results from this visit  Results for orders placed or performed in visit on 12/21/24   POCT UA Automated manually resulted   Result Value Ref Range    POC Color, Urine Light-Yellow Straw, Yellow, Light-Yellow    POC Appearance, Urine Clear Clear    POC Glucose, Urine 500 (3+) (A) NEGATIVE mg/dl    POC Bilirubin, Urine SMALL (1+) (A) NEGATIVE    POC Ketones, Urine NEGATIVE NEGATIVE mg/dl    POC Specific Gravity, Urine 1.025 1.005 - 1.035    POC Blood, Urine NEGATIVE NEGATIVE    POC PH, Urine 6.0 No Reference Range Established PH    POC Protein, Urine TRACE (A) NEGATIVE mg/dl    POC Urobilinogen, Urine 0.2 0.2, 1.0 EU/DL    Poc Nitrite, Urine NEGATIVE NEGATIVE    POC Leukocytes, Urine NEGATIVE NEGATIVE      No results found.    Diagnostic study results (if any) were reviewed by yKlie Chi PA-C.    Assessment/Plan   Allergies, medications, history, and pertinent labs/EKGs/Imaging reviewed by Kylie Chi PA-C.     Medical Decision Making  - UA pos protein, bili, glucose. Will sent out a urine cx. Not treating for UTI today. Will treat with cyclobenzaprine for muscle strain.   -         Patient is educated about their diagnoses.     -          Discussed medications benefits and adverse effects.     -          Answered all patient’s questions.     -          Patient will call 911 or go to the nearest ED if worsen symptoms .     -          Patient is agreeable to the plan of care and is deemed stable upon discharge.     -          Follow up with your primary care provider in two days.      Orders and Diagnoses  Diagnoses and all orders for this visit:  Back pain with radiation  -     POCT UA Automated manually resulted  -     Urine Culture  -     cyclobenzaprine (Flexeril) 10 mg tablet; Take 1 tablet (10 mg) by mouth 2 times a day as needed for muscle spasms for up to 7 days.  Flank pain, acute      Medical Admin Record      Patient disposition: Home    Electronically  signed by Kylie Chi PA-C  9:28 AM

## 2024-12-23 DIAGNOSIS — E11.9 TYPE 2 DIABETES MELLITUS WITHOUT COMPLICATION, WITHOUT LONG-TERM CURRENT USE OF INSULIN (MULTI): ICD-10-CM

## 2024-12-23 LAB — BACTERIA UR CULT: NORMAL

## 2024-12-23 RX ORDER — BLOOD SUGAR DIAGNOSTIC
STRIP MISCELLANEOUS
Qty: 100 STRIP | Refills: 3 | Status: SHIPPED | OUTPATIENT
Start: 2024-12-23

## 2024-12-24 ENCOUNTER — OFFICE VISIT (OUTPATIENT)
Dept: URGENT CARE | Age: 69
End: 2024-12-24
Payer: MEDICARE

## 2024-12-24 ENCOUNTER — HOSPITAL ENCOUNTER (EMERGENCY)
Facility: HOSPITAL | Age: 69
Discharge: HOME | End: 2024-12-24
Payer: MEDICARE

## 2024-12-24 ENCOUNTER — APPOINTMENT (OUTPATIENT)
Dept: RADIOLOGY | Facility: HOSPITAL | Age: 69
End: 2024-12-24
Payer: MEDICARE

## 2024-12-24 VITALS
DIASTOLIC BLOOD PRESSURE: 70 MMHG | TEMPERATURE: 97.9 F | HEART RATE: 84 BPM | RESPIRATION RATE: 15 BRPM | HEIGHT: 65 IN | BODY MASS INDEX: 32.49 KG/M2 | OXYGEN SATURATION: 98 % | SYSTOLIC BLOOD PRESSURE: 138 MMHG | WEIGHT: 195 LBS

## 2024-12-24 VITALS
RESPIRATION RATE: 16 BRPM | SYSTOLIC BLOOD PRESSURE: 138 MMHG | OXYGEN SATURATION: 97 % | DIASTOLIC BLOOD PRESSURE: 80 MMHG | HEART RATE: 105 BPM

## 2024-12-24 DIAGNOSIS — K59.00 CONSTIPATION, UNSPECIFIED CONSTIPATION TYPE: Primary | ICD-10-CM

## 2024-12-24 DIAGNOSIS — R33.9 URINARY RETENTION: ICD-10-CM

## 2024-12-24 DIAGNOSIS — R10.84 GENERALIZED ABDOMINAL PAIN: Primary | ICD-10-CM

## 2024-12-24 DIAGNOSIS — M54.50 ACUTE LOW BACK PAIN WITHOUT SCIATICA, UNSPECIFIED BACK PAIN LATERALITY: ICD-10-CM

## 2024-12-24 DIAGNOSIS — K59.00 CONSTIPATION, UNSPECIFIED CONSTIPATION TYPE: ICD-10-CM

## 2024-12-24 LAB
ALBUMIN SERPL BCP-MCNC: 4.1 G/DL (ref 3.4–5)
ALP SERPL-CCNC: 71 U/L (ref 33–136)
ALT SERPL W P-5'-P-CCNC: 26 U/L (ref 7–45)
ANION GAP SERPL CALC-SCNC: 13 MMOL/L (ref 10–20)
APPEARANCE UR: CLEAR
AST SERPL W P-5'-P-CCNC: 20 U/L (ref 9–39)
BASOPHILS # BLD AUTO: 0.04 X10*3/UL (ref 0–0.1)
BASOPHILS NFR BLD AUTO: 0.3 %
BILIRUB SERPL-MCNC: 0.5 MG/DL (ref 0–1.2)
BILIRUB UR STRIP.AUTO-MCNC: NEGATIVE MG/DL
BUN SERPL-MCNC: 15 MG/DL (ref 6–23)
CALCIUM SERPL-MCNC: 8.7 MG/DL (ref 8.6–10.3)
CHLORIDE SERPL-SCNC: 101 MMOL/L (ref 98–107)
CO2 SERPL-SCNC: 27 MMOL/L (ref 21–32)
COLOR UR: YELLOW
CREAT SERPL-MCNC: 0.94 MG/DL (ref 0.5–1.05)
EGFRCR SERPLBLD CKD-EPI 2021: 67 ML/MIN/1.73M*2
EOSINOPHIL # BLD AUTO: 0.08 X10*3/UL (ref 0–0.7)
EOSINOPHIL NFR BLD AUTO: 0.6 %
ERYTHROCYTE [DISTWIDTH] IN BLOOD BY AUTOMATED COUNT: 13 % (ref 11.5–14.5)
GLUCOSE SERPL-MCNC: 185 MG/DL (ref 74–99)
GLUCOSE UR STRIP.AUTO-MCNC: ABNORMAL MG/DL
HCT VFR BLD AUTO: 36 % (ref 36–46)
HGB BLD-MCNC: 11.9 G/DL (ref 12–16)
HOLD SPECIMEN: NORMAL
IMM GRANULOCYTES # BLD AUTO: 0.04 X10*3/UL (ref 0–0.7)
IMM GRANULOCYTES NFR BLD AUTO: 0.3 % (ref 0–0.9)
KETONES UR STRIP.AUTO-MCNC: NEGATIVE MG/DL
LACTATE SERPL-SCNC: 1.2 MMOL/L (ref 0.4–2)
LEUKOCYTE ESTERASE UR QL STRIP.AUTO: NEGATIVE
LIPASE SERPL-CCNC: 25 U/L (ref 9–82)
LYMPHOCYTES # BLD AUTO: 0.97 X10*3/UL (ref 1.2–4.8)
LYMPHOCYTES NFR BLD AUTO: 7.8 %
MCH RBC QN AUTO: 30.4 PG (ref 26–34)
MCHC RBC AUTO-ENTMCNC: 33.1 G/DL (ref 32–36)
MCV RBC AUTO: 92 FL (ref 80–100)
MONOCYTES # BLD AUTO: 0.35 X10*3/UL (ref 0.1–1)
MONOCYTES NFR BLD AUTO: 2.8 %
NEUTROPHILS # BLD AUTO: 11.01 X10*3/UL (ref 1.2–7.7)
NEUTROPHILS NFR BLD AUTO: 88.2 %
NITRITE UR QL STRIP.AUTO: NEGATIVE
NRBC BLD-RTO: 0 /100 WBCS (ref 0–0)
PH UR STRIP.AUTO: 5.5 [PH]
PLATELET # BLD AUTO: 356 X10*3/UL (ref 150–450)
POTASSIUM SERPL-SCNC: 3.6 MMOL/L (ref 3.5–5.3)
PROT SERPL-MCNC: 6.5 G/DL (ref 6.4–8.2)
PROT UR STRIP.AUTO-MCNC: ABNORMAL MG/DL
RBC # BLD AUTO: 3.92 X10*6/UL (ref 4–5.2)
RBC # UR STRIP.AUTO: NEGATIVE /UL
RBC #/AREA URNS AUTO: NORMAL /HPF
SODIUM SERPL-SCNC: 137 MMOL/L (ref 136–145)
SP GR UR STRIP.AUTO: 1.01
UROBILINOGEN UR STRIP.AUTO-MCNC: ABNORMAL MG/DL
WBC # BLD AUTO: 12.5 X10*3/UL (ref 4.4–11.3)
WBC #/AREA URNS AUTO: NORMAL /HPF

## 2024-12-24 PROCEDURE — 74177 CT ABD & PELVIS W/CONTRAST: CPT | Mod: FOREIGN READ | Performed by: RADIOLOGY

## 2024-12-24 PROCEDURE — 2550000001 HC RX 255 CONTRASTS: Performed by: NURSE PRACTITIONER

## 2024-12-24 PROCEDURE — 84075 ASSAY ALKALINE PHOSPHATASE: CPT | Performed by: NURSE PRACTITIONER

## 2024-12-24 PROCEDURE — 99285 EMERGENCY DEPT VISIT HI MDM: CPT | Mod: 25

## 2024-12-24 PROCEDURE — 85025 COMPLETE CBC W/AUTO DIFF WBC: CPT | Performed by: NURSE PRACTITIONER

## 2024-12-24 PROCEDURE — 96360 HYDRATION IV INFUSION INIT: CPT | Mod: 59

## 2024-12-24 PROCEDURE — 36415 COLL VENOUS BLD VENIPUNCTURE: CPT | Performed by: NURSE PRACTITIONER

## 2024-12-24 PROCEDURE — 81001 URINALYSIS AUTO W/SCOPE: CPT | Performed by: NURSE PRACTITIONER

## 2024-12-24 PROCEDURE — 74177 CT ABD & PELVIS W/CONTRAST: CPT

## 2024-12-24 PROCEDURE — 51798 US URINE CAPACITY MEASURE: CPT

## 2024-12-24 PROCEDURE — 83605 ASSAY OF LACTIC ACID: CPT | Performed by: NURSE PRACTITIONER

## 2024-12-24 PROCEDURE — 2500000004 HC RX 250 GENERAL PHARMACY W/ HCPCS (ALT 636 FOR OP/ED): Performed by: NURSE PRACTITIONER

## 2024-12-24 PROCEDURE — 83690 ASSAY OF LIPASE: CPT | Performed by: NURSE PRACTITIONER

## 2024-12-24 RX ORDER — ONDANSETRON HYDROCHLORIDE 2 MG/ML
4 INJECTION, SOLUTION INTRAVENOUS ONCE
Status: DISCONTINUED | OUTPATIENT
Start: 2024-12-24 | End: 2024-12-24 | Stop reason: HOSPADM

## 2024-12-24 RX ORDER — MORPHINE SULFATE 4 MG/ML
4 INJECTION INTRAVENOUS ONCE
Status: DISCONTINUED | OUTPATIENT
Start: 2024-12-24 | End: 2024-12-24 | Stop reason: HOSPADM

## 2024-12-24 RX ORDER — SYRING-NEEDL,DISP,INSUL,0.3 ML 29 G X1/2"
296 SYRINGE, EMPTY DISPOSABLE MISCELLANEOUS ONCE
Qty: 296 ML | Refills: 0 | Status: SHIPPED | OUTPATIENT
Start: 2024-12-24 | End: 2024-12-24

## 2024-12-24 RX ORDER — LIDOCAINE 50 MG/G
1 PATCH TOPICAL DAILY
Qty: 10 PATCH | Refills: 0 | Status: SHIPPED | OUTPATIENT
Start: 2024-12-24

## 2024-12-24 ASSESSMENT — ENCOUNTER SYMPTOMS
HEMATURIA: 0
FEVER: 0
NAUSEA: 0
VOMITING: 0
FLANK PAIN: 0
ABDOMINAL PAIN: 1
BACK PAIN: 1
FREQUENCY: 0
SHORTNESS OF BREATH: 0
CONSTIPATION: 1
CHILLS: 0
DYSURIA: 0
ABDOMINAL DISTENTION: 1
DIFFICULTY URINATING: 1
DIARRHEA: 0

## 2024-12-24 ASSESSMENT — PAIN DESCRIPTION - DESCRIPTORS: DESCRIPTORS: DISCOMFORT

## 2024-12-24 ASSESSMENT — LIFESTYLE VARIABLES
HAVE YOU EVER FELT YOU SHOULD CUT DOWN ON YOUR DRINKING: NO
EVER HAD A DRINK FIRST THING IN THE MORNING TO STEADY YOUR NERVES TO GET RID OF A HANGOVER: NO
TOTAL SCORE: 0
HAVE PEOPLE ANNOYED YOU BY CRITICIZING YOUR DRINKING: NO
EVER FELT BAD OR GUILTY ABOUT YOUR DRINKING: NO

## 2024-12-24 ASSESSMENT — PAIN - FUNCTIONAL ASSESSMENT: PAIN_FUNCTIONAL_ASSESSMENT: 0-10

## 2024-12-24 ASSESSMENT — PAIN DESCRIPTION - PAIN TYPE: TYPE: ACUTE PAIN

## 2024-12-24 ASSESSMENT — COLUMBIA-SUICIDE SEVERITY RATING SCALE - C-SSRS
2. HAVE YOU ACTUALLY HAD ANY THOUGHTS OF KILLING YOURSELF?: NO
6. HAVE YOU EVER DONE ANYTHING, STARTED TO DO ANYTHING, OR PREPARED TO DO ANYTHING TO END YOUR LIFE?: NO
1. IN THE PAST MONTH, HAVE YOU WISHED YOU WERE DEAD OR WISHED YOU COULD GO TO SLEEP AND NOT WAKE UP?: NO

## 2024-12-24 ASSESSMENT — PAIN SCALES - GENERAL: PAINLEVEL_OUTOF10: 5 - MODERATE PAIN

## 2024-12-24 ASSESSMENT — PAIN DESCRIPTION - LOCATION: LOCATION: ABDOMEN

## 2024-12-24 NOTE — PROGRESS NOTES
Subjective   Patient ID: Kaity Eric is a 69 y.o. female. They present today with a chief complaint of Constipation (No bowel movement x 1.5 weeks. ), Abdominal Pain, and Urinary Retention.    History of Present Illness  Patient presents with low back pain, constipation for over 1.5 weeks and now urinary retention. Patient explains that she was seen in urgent care 3 days go, given flexeril. Urine culture from that visit was negative. {Patient explains that she has tried osmotic laxatives and suppositories over the counter with no relief of constipation. Has abdominal cramping and back pain makes it uncomfortable for patient to even sit down, unable to sit on exam table. Denies fever, chills, sweats. Denies n/v/d. Denies chest pain, sob. Patient explains that she has had to now wear adult depends the last few days as she has some bowel incontinence, light leaking no true bowel movement.         Constipation  Associated symptoms: abdominal pain and back pain    Associated symptoms: no diarrhea, no dysuria, no fever, no nausea and no vomiting    Abdominal Pain  Associated symptoms include constipation. Pertinent negatives include no diarrhea, dysuria, fever, frequency, hematuria, nausea or vomiting.       Past Medical History  Allergies as of 12/24/2024 - Reviewed 12/24/2024   Allergen Reaction Noted    Jardiance [empagliflozin] Other 01/11/2024    Latex Hives and Swelling 08/21/2012    Red dye Swelling 04/27/2023       (Not in a hospital admission)       Past Medical History:   Diagnosis Date    CN (constipation) 10/15/2008    Combined forms of age-related cataract of left eye 11/06/2020    Last Assessment & Plan: Formatting of this note might be different from the original. - OS cataract is visually significant and is causing patients symptoms. - Patient is also complaining of symptoms of anisometropia. - Patient trialed multiple CL prescriptions, thinks she may like -4.25 but still with symptoms of cataract. -  "Will trial patient with -4.25 CL (prescription given). If patient can to    Dry eye syndrome of both eyes 11/06/2020    Last Assessment & Plan: Formatting of this note might be different from the original. - Advised patient to use artificial tears 4 times a day in both eyes. Told to avoid Visine, Clear Eyes, or \"redness relief\" drops. Last Assessment & Plan: Formatting of this note might be different from the original. - Advised patient to use artificial tears 4 times a day in both eyes. Told to avoid Visine, Clear    History of phacoemulsification of cataract with intraocular lens implantation, right 01/08/2021    Formatting of this note might be different from the original. - 1/7/21 Luis M/iLsa SA6AT3 +15.0 PCIOL, topical, Viscoat, Miostat, Diamox 500mg post-op due to posterior pressure. Last Assessment & Plan: Formatting of this note might be different from the original. - 1 month post-op, doing well. - Patient 20/30, BCVA 20/20. - Toric lens centered and aligned appropriately, no astigmatism with presc    Personal history of diseases of the skin and subcutaneous tissue     History of psoriasis    Personal history of diseases of the skin and subcutaneous tissue     History of rosacea       Past Surgical History:   Procedure Laterality Date    OTHER SURGICAL HISTORY  07/12/2019    Tonsillectomy    OTHER SURGICAL HISTORY  10/13/2020    Colonoscopy        reports that she has never smoked. She has never used smokeless tobacco. She reports that she does not currently use alcohol. She reports that she does not use drugs.    Review of Systems  Review of Systems   Constitutional:  Negative for chills and fever.   Respiratory:  Negative for shortness of breath.    Cardiovascular:  Negative for chest pain.   Gastrointestinal:  Positive for abdominal distention, abdominal pain and constipation. Negative for diarrhea, nausea and vomiting.   Genitourinary:  Positive for decreased urine volume and difficulty urinating. " Negative for dysuria, flank pain, frequency, hematuria, urgency, vaginal bleeding, vaginal discharge and vaginal pain.   Musculoskeletal:  Positive for back pain.                                  Objective    Vitals:    12/24/24 0813   BP: 138/80   Pulse: 105   Resp: 16   SpO2: 97%     No LMP recorded (lmp unknown). Patient is postmenopausal.    Physical Exam  Constitutional:       General: She is in acute distress.      Appearance: Normal appearance.      Comments: Patient is uncomfortable and refusing to lay on exam table for abdominal exam secondary to pain.    HENT:      Head: Normocephalic and atraumatic.   Eyes:      Extraocular Movements: Extraocular movements intact.      Conjunctiva/sclera: Conjunctivae normal.      Pupils: Pupils are equal, round, and reactive to light.   Musculoskeletal:      Cervical back: Normal range of motion.   Neurological:      Mental Status: She is alert.         Procedures    Point of Care Test & Imaging Results from this visit  No results found for this visit on 12/24/24.   No results found.    Diagnostic study results (if any) were reviewed by Jerri Jones PA-C.    Assessment/Plan   Allergies, medications, history, and pertinent labs/EKGs/Imaging reviewed by Jerri Jones PA-C.     Medical Decision Making  MDM- Patient presents with symptoms of abdominal pain, back pain and prolonged constipation along with new urinary retention that is concerning and will need higher level of care including but not limited to CT imaging. Concern at this time is for possible mass, obstruction, or even cauda equina. Case reviewed with supervising physician who agrees with plan.     Orders and Diagnoses  Diagnoses and all orders for this visit:  Generalized abdominal pain  Acute low back pain without sciatica, unspecified back pain laterality  Constipation, unspecified constipation type  Urinary retention      Medical Admin Record      Patient disposition: ED    Electronically signed  by Jerri Jones PA-C  8:30 AM

## 2024-12-24 NOTE — ED PROVIDER NOTES
HPI   Chief Complaint   Patient presents with    Constipation       This is a 69-year-old  female presenting to the emergency room with complaints of constipation, back pain, and inability to urinate.  The patient states that she has been dealing with right-sided back pain for the past week.  The patient reports that the pain radiated across her upper abdomen.  She went to the urgent care this weekend and was told that it was muscle skeletal.  She was provided a muscle relaxer.  Patient realized that she had not had a bowel movement in over a week.  She has been drinking large amounts of water.  She feels very thirsty.  The patient lost the ability to fully urinate late last night.  She is already unable to goal FEW drops.  She has the urge like she has to urinate.  She denies any fevers, chills, nausea, vomiting.  The patient went to the urgent care again today.  She was referred to the emergency room for further evaluation and treatment.  The patient denies any paresthesias or focal weakness.  Denies any saddle anesthesia.  Denies any spinal injections.  She does not have a history of constipation.  She is uncertain if the back pain started Prosorba constipation.  Reports that she has been under a lot of stress due to multiple deaths in the family.  She is not having any chest pain, shortness of breath, dizziness, palpitations, syncope, headache, or vaginal symptoms.              Patient History   Past Medical History:   Diagnosis Date    CN (constipation) 10/15/2008    Combined forms of age-related cataract of left eye 11/06/2020    Last Assessment & Plan: Formatting of this note might be different from the original. - OS cataract is visually significant and is causing patients symptoms. - Patient is also complaining of symptoms of anisometropia. - Patient trialed multiple CL prescriptions, thinks she may like -4.25 but still with symptoms of cataract. - Will trial patient with -4.25 CL (prescription  "given). If patient can to    Dry eye syndrome of both eyes 11/06/2020    Last Assessment & Plan: Formatting of this note might be different from the original. - Advised patient to use artificial tears 4 times a day in both eyes. Told to avoid Visine, Clear Eyes, or \"redness relief\" drops. Last Assessment & Plan: Formatting of this note might be different from the original. - Advised patient to use artificial tears 4 times a day in both eyes. Told to avoid Visine, Clear    History of phacoemulsification of cataract with intraocular lens implantation, right 01/08/2021    Formatting of this note might be different from the original. - 1/7/21 Luis M/Lisa SA6AT3 +15.0 PCIOL, topical, Viscoat, Miostat, Diamox 500mg post-op due to posterior pressure. Last Assessment & Plan: Formatting of this note might be different from the original. - 1 month post-op, doing well. - Patient 20/30, BCVA 20/20. - Toric lens centered and aligned appropriately, no astigmatism with presc    Personal history of diseases of the skin and subcutaneous tissue     History of psoriasis    Personal history of diseases of the skin and subcutaneous tissue     History of rosacea     Past Surgical History:   Procedure Laterality Date    OTHER SURGICAL HISTORY  07/12/2019    Tonsillectomy    OTHER SURGICAL HISTORY  10/13/2020    Colonoscopy     Family History   Problem Relation Name Age of Onset    Coronary artery disease Mother      Heart attack Father      Uterine cancer Sister      Heart attack Brother      Melanoma Other       Social History     Tobacco Use    Smoking status: Never    Smokeless tobacco: Never   Vaping Use    Vaping status: Never Used   Substance Use Topics    Alcohol use: Not Currently     Comment: once a year    Drug use: Never       Physical Exam   ED Triage Vitals [12/24/24 0841]   Temperature Heart Rate Respirations BP   36.6 °C (97.9 °F) 99 16 118/69      Pulse Ox Temp src Heart Rate Source Patient Position   100 % -- -- --    "   BP Location FiO2 (%)     -- --       Physical Exam  Vitals and nursing note reviewed.   HENT:      Head: Normocephalic.      Right Ear: External ear normal.      Left Ear: External ear normal.      Nose: Nose normal.      Mouth/Throat:      Pharynx: Oropharynx is clear.   Eyes:      Conjunctiva/sclera: Conjunctivae normal.      Pupils: Pupils are equal, round, and reactive to light.   Cardiovascular:      Rate and Rhythm: Normal rate and regular rhythm.      Pulses: Normal pulses.      Heart sounds: Normal heart sounds.   Abdominal:      General: Bowel sounds are normal.      Palpations: Abdomen is soft.   Genitourinary:     General: Normal vulva.   Musculoskeletal:         General: Normal range of motion.      Cervical back: Normal range of motion.      Comments: No midline spinal tenderness.  Moves all extremities with normal range of motion muscle strength.  Gait is stable.  Distal extremity with brisk cap refill and sensation intact.  No calf pain or palpable cords.   Skin:     General: Skin is warm.   Neurological:      General: No focal deficit present.      Mental Status: She is alert.   Psychiatric:         Mood and Affect: Mood normal.           ED Course & MDM   Diagnoses as of 12/24/24 1540   Constipation, unspecified constipation type   Urinary retention   Acute low back pain without sciatica, unspecified back pain laterality                 No data recorded     Raj Coma Scale Score: 15 (12/24/24 0846 : Sapphire Boyle RN)                           Medical Decision Making  Patient is seen and evaluated by the nurse practitioner, Amara Broussard.  Patient is presenting to the emergency room with complaints of constipation.  During the examination patient disclosed that she has not been able to urinate this morning.  Bladder scan was performed and showed that she was retaining approximately 150 mL of urine.  The patient has been dealing with back pain for the past few days.  She does not have any  obvious signs of cauda equina syndrome.  She does not have any leg weakness or paresthesias.  Denies any saddle anesthesia.  The patient has normal rectal tone and sensation.  Patient does not have any obvious signs of fecal impaction.  Differential diagnosis includes urinary tract infection, kidney stone, pyelonephritis, outlet obstruction, constipation, dehydration, or other acute process.  Saline lock was established.  Laboratory studies were drawn with results as noted.  Medication was ordered to treat her pain, which she refused.  The patient was administered 1 L of normal saline wide open for hydration.  Laboratory studies showed the patient had a mildly elevated white count of 12.5.  No lactic acidosis.  Lipase was within normal limits.  CMP was largely unremarkable.  The patient was able to give a small urine sample which was negative for infection or blood.  A CT of the abdomen pelvis was performed that showed a large quantity of stool throughout the colon most pronounced along the sigmoid colon and rectum.  The patient was notified of her imaging and laboratory results.  Patient reported to increase of lower abdominal discomfort.  A repeat bladder scan was obtained and showed that she was retaining approximately 600 mL of urine.  We discussed drainage options.  The patient was not initially amenable to have a straight cath or Espinoza catheter placed.  She wanted to see if we got the constipation out of the evening if she be able to urinate freely.  The patient was administered a soapsuds enema.  She was able to remove a small amount of stool.  Patient reported improvement of her symptoms but she still felt very bloated and was not able to urinate.  The patient was drained via straight cath.  Through shared decision making, the patient decided that she did not  wish to have a Espinoza catheter placed.  She states that she will ontinue to work on her constipation at home over the next few hours.  She was given  strict return precautions.  The patient was educated on cauda equina syndrome and is to return if she has any weakness, paresthesias, saddle anesthesia, or further urinary or stool retention.  She was provided prescription for magnesium citrate and Lidoderm patches.  She is to increase oral oral fluid intake.  The patient was referred to urology for further evaluation and treatment.  She was discharged in stable condition with computer discharge instructions given.        Procedure  Procedures     ESTRADA Youssef-ESTELLA  12/24/24 6527

## 2024-12-24 NOTE — ED TRIAGE NOTES
Pt presents for constipation for 1 week. Was seen at urgent care on Saturday and has been given multiple medications to facilitate a bowel movement but has not had any success. She states that she has right lower back pain and was given a muscle relaxer. She also states that she is having difficulty urinating.

## 2025-01-13 ENCOUNTER — APPOINTMENT (OUTPATIENT)
Dept: PHARMACY | Facility: HOSPITAL | Age: 70
End: 2025-01-13
Payer: MEDICARE

## 2025-01-13 DIAGNOSIS — E78.1 HYPERTRIGLYCERIDEMIA: ICD-10-CM

## 2025-01-13 DIAGNOSIS — E11.9 TYPE 2 DIABETES MELLITUS WITHOUT COMPLICATION, WITHOUT LONG-TERM CURRENT USE OF INSULIN (MULTI): ICD-10-CM

## 2025-01-13 NOTE — ASSESSMENT & PLAN NOTE
Patient's diabetes is currently controlled, as shown by most recent A1c of 6.3% (goal <7%). SMBG currently elevated due to the holidays. Most recent A1c greatly improved (8.8%-->6.3%). Hypoglycemia concerns resolved since stopping glimepiride. Nausea and vomiting has resolved in the time she has been off of Mounjaro. Will restart Mounjaro 5mg tomorrow and up-titrate as tolerated.    Farxiga 5mg tolerated well to this point. Had an ED visit for constipation/urinary retention on 12/24, but was not shown to have UTI. Will continue to look out for side effects. Consider increasing dose once patient feels better from Mounjaro side effects.      Plan:  CONTINUE   Metformin XL 2000 mg daily  Farxiga 5mg daily  DECREASE  Mounjaro 5mg once weekly  STOP  Pioglitazone 45mg daily    Monitoring and Education  Improve adherence to oral medications  Discussed strategies to prevent constipation including eating fiber and using Miralax at first sign of constipation.  Educated on increasing protein consumption and decreasing consumption of carbs

## 2025-01-13 NOTE — ASSESSMENT & PLAN NOTE
Contacted Dr. Blackburn per Dr. Zuñiga request.   Triglycerides are slightly above goal (152) on Vascaepa 2g BID. Labs slightly worsened from last check in December 2023. Patient notes previous therapy interruption due to cost. Admits some poor adherence this year. Patient educated on importance of adherence to regimen.   LFTs are WNL   Lab Results   Component Value Date    ALT 26 12/24/2024    AST 20 12/24/2024    ALKPHOS 71 12/24/2024    BILITOT 0.5 12/24/2024     Medication Changes:  CONTINUE  Vascepa 2g BID  Atorvastatin 40mg daily

## 2025-01-13 NOTE — PROGRESS NOTES
"Patient ID: Kaity Eric is a 69 y.o. female who presents for Diabetes    Referring Provider: Mei Taylor DO  PCP: Mei Taylor DO   Last visit with PCP: 12/16/2024 Next visit with PCP: 6/16/25    Endocrinologist: Dr. Osman Cr (next visit 3/5/2024)    Subjective   Patient is currently in [place] on vacation. Admits she has not been taking medications regularly or eating as she should. Will return to [place] tomorrow and plans to get back into her routine with medications and diet. Still has not started her Mounjaro 5mg. Enjoying this break from the side effects, but admits she has gained ~10 pounds back.    Discussed ED visit on 12/24/24. Patient suspects cyclobenzaprine prescribed at urgent care may have worsened her situation. Constipation and urinary retention have resolved. Is keeping a closer eye on things now. Reminded that constipation is a potential side effect of Mounjaro and UTI is a potential complication of Farxiga. Patient will call me if these symptoms return.    Diabetes    Current Pharmacotherapy:   Metformin XL 2000 mg daily  Dapagliflozin 5mg daily  Pioglitazone 45mg daily    Not currently taking: Mounjaro  BRIANNA (10mg dose):   Vomiting 2-3 days/week  Injection site reaction (redness, swelling, itching): improved with hydrocortisone cream  Weight loss: Patient reports weight ~205 lb currently. (228 lb when starting Mounjaro, 190 at lowest)    Note: previously tried Jardiance 25mg daily but experienced yeast infections. Awful- worst yeast infections whole time on it.   Discontinued: glimepiride     Diet  Encouraged patient to increase consumption of protein and vegetable and decrease consumption of carbohydrates. Patient working to eat 80g protein in a day. Tried protein shakes and powder- not palatable. Not hungry in the morning. Recommended she may try low sugar greek yogurt, protein-enriched oatmeal, [place] cakes.    1/13: Patient has been eating a \"holiday diet\". Plans to " No show get back to her regular mindful diet tomorrow.    Exercise  Hairline fracture in foot has healed. Walking more.    SECONDARY PREVENTION  - Statin? Atorvastatin 40 mg   - ACE-I/ARB? Lisinopril 20 mg   - Aspirin? No    -The ASCVD Risk score (Clay MCDERMOTT, et al., 2019) failed to calculate for the following reasons:    The valid total cholesterol range is 130 to 320 mg/dL      Current monitoring regimen:   Patient is using: glucometer     SMBG Fasting Readings from   12/12: Am: 148, 112, 116, 146, 151, 141, 158, 214, 175, 123  From 11/25: No pills (5 days/10) 119, 131, 144, 156, 176, 173, 144, 167, 168, 137, 138.   From 10/28: couple days missed. Guests from out of town. 149, 174, 127, 129   From 9/23: 124, 108, 108, 161, 93, 116.   From 9/11: 166 208 196 147 135 184   8/1/24: 132, 169, 174, 144, 119, 131, 157, 166, 176, 159, 190, 90. 150, 183.   SMBG:   Noted that when readings are very high, she usually skipped her pills the day before     Pertinent PMH Review:  - PMH of Pancreatitis: no  - PMH/FH of Medullary Thyroid Cancer: no  - PMH of Retinopathy: no      VAF Application    Medication(s): Mounjaro, Vascepa, *Add Farxiga 11/25    Application response: [Approved]    Approved until: 6/28/2025    Additional information:  - Patient will get this prescription mailed to them from  pharmacies to receive medication at no cost  - Patient will need to re-enroll in this program prior to expiration date to maintain coverage    Review of Systems      Objective     LMP  (LMP Unknown)    BP Readings from Last 4 Encounters:   12/24/24 138/70   12/24/24 138/80   12/21/24 126/75   12/16/24 110/66      There were no vitals filed for this visit.     Labs  Lab Results   Component Value Date    BILITOT 0.5 12/24/2024    CALCIUM 8.7 12/24/2024    CO2 27 12/24/2024     12/24/2024    CREATININE 0.94 12/24/2024    GLUCOSE 185 (H) 12/24/2024    ALKPHOS 71 12/24/2024    K 3.6 12/24/2024    PROT 6.5 12/24/2024     12/24/2024     AST 20 12/24/2024    ALT 26 12/24/2024    BUN 15 12/24/2024    ANIONGAP 13 12/24/2024    ALBUMIN 4.1 12/24/2024    LIPASE 25 12/24/2024    GFRF 75 08/22/2023     Lab Results   Component Value Date    TRIG 152 (H) 09/26/2024    CHOL 105 09/26/2024    LDLCALC 36 09/26/2024    HDL 38.3 09/26/2024     Lab Results   Component Value Date    HGBA1C 6.3 (H) 09/26/2024       Current Outpatient Medications   Medication Instructions    atorvastatin (LIPITOR) 40 mg, oral, Daily    blood sugar diagnostic (OneTouch Ultra Test) strip test once daily    cyanocobalamin (VITAMIN B-12) 2,000 mcg, Daily    Farxiga 5 mg, oral, Daily    ferrous sulfate (325 mg ferrous sulfate) 325 mg, 3 times weekly    hydrocortisone 2.5 % cream Topical, 2 times daily PRN    icosapent ethyL (VASCEPA) 2 g, oral, 2 times daily (morning and late afternoon)    lidocaine (Lidoderm) 5 % patch 1 patch, transdermal, Daily, Remove & discard patch within 12 hours or as directed by MD.    lisinopriL-hydrochlorothiazide 20-25 mg tablet 1 tablet, oral, Daily    metFORMIN XR (GLUCOPHAGE-XR) 2,000 mg, oral, Daily    Mounjaro 5 mg, subcutaneous, Weekly    venlafaxine XR (EFFEXOR-XR) 75 mg, oral, Daily, Take with food.         Drug Interactions;  None at time of review    Assessment/Plan   Problem List Items Addressed This Visit       Hypertriglyceridemia     Triglycerides are slightly above goal (152) on Vascaepa 2g BID. Labs slightly worsened from last check in December 2023. Patient notes previous therapy interruption due to cost. Admits some poor adherence this year. Patient educated on importance of adherence to regimen.   LFTs are WNL   Lab Results   Component Value Date    ALT 26 12/24/2024    AST 20 12/24/2024    ALKPHOS 71 12/24/2024    BILITOT 0.5 12/24/2024     Medication Changes:  CONTINUE  Vascepa 2g BID  Atorvastatin 40mg daily         Relevant Orders    Referral to Clinical Pharmacy    Type 2 diabetes mellitus without complication, without long-term  current use of insulin (Multi)     Patient's diabetes is currently controlled, as shown by most recent A1c of 6.3% (goal <7%). SMBG currently elevated due to the holidays. Most recent A1c greatly improved (8.8%-->6.3%). Hypoglycemia concerns resolved since stopping glimepiride. Nausea and vomiting has resolved in the time she has been off of Mounjaro. Will restart Mounjaro 5mg tomorrow and up-titrate as tolerated.    Farxiga 5mg tolerated well to this point. Had an ED visit for constipation/urinary retention on 12/24, but was not shown to have UTI. Will continue to look out for side effects. Consider increasing dose once patient feels better from Mounjaro side effects.      Plan:  CONTINUE   Metformin XL 2000 mg daily  Farxiga 5mg daily  DECREASE  Mounjaro 5mg once weekly  STOP  Pioglitazone 45mg daily    Monitoring and Education  Improve adherence to oral medications  Discussed strategies to prevent constipation including eating fiber and using Miralax at first sign of constipation.  Educated on increasing protein consumption and decreasing consumption of carbs         Relevant Orders    Referral to Clinical Pharmacy       Follow-up: 2/3/25 at 9:00am     Time spent with pt: Total length of time 30(minutes) of the encounter and more than 50% was spent counseling the patient.    Joceline Sepulveda, PharmD    Continue all meds under the continuation of care with the referring provider and clinical pharmacy team.

## 2025-01-14 ENCOUNTER — APPOINTMENT (OUTPATIENT)
Dept: CARDIOLOGY | Facility: CLINIC | Age: 70
End: 2025-01-14
Payer: MEDICARE

## 2025-01-28 ENCOUNTER — APPOINTMENT (OUTPATIENT)
Dept: CARDIOLOGY | Facility: HOSPITAL | Age: 70
End: 2025-01-28
Payer: MEDICARE

## 2025-02-03 ENCOUNTER — APPOINTMENT (OUTPATIENT)
Dept: PHARMACY | Facility: HOSPITAL | Age: 70
End: 2025-02-03
Payer: MEDICARE

## 2025-02-03 DIAGNOSIS — E78.1 HYPERTRIGLYCERIDEMIA: ICD-10-CM

## 2025-02-03 DIAGNOSIS — E11.9 TYPE 2 DIABETES MELLITUS WITHOUT COMPLICATION, WITHOUT LONG-TERM CURRENT USE OF INSULIN (MULTI): ICD-10-CM

## 2025-02-03 PROCEDURE — RXMED WILLOW AMBULATORY MEDICATION CHARGE

## 2025-02-03 RX ORDER — TIRZEPATIDE 5 MG/.5ML
5 INJECTION, SOLUTION SUBCUTANEOUS WEEKLY
Qty: 2 ML | Refills: 0 | Status: SHIPPED | OUTPATIENT
Start: 2025-02-03

## 2025-02-03 RX ORDER — PIOGLITAZONEHYDROCHLORIDE 30 MG/1
30 TABLET ORAL DAILY
Qty: 90 TABLET | Refills: 3 | Status: SHIPPED | OUTPATIENT
Start: 2025-02-03 | End: 2026-02-03

## 2025-02-03 NOTE — ASSESSMENT & PLAN NOTE
Triglycerides are slightly above goal (152) on Vascaepa 2g BID. Labs slightly worsened from last check in December 2023. Patient notes previous therapy interruption due to cost. Admits some poor adherence this year. Patient educated on importance of adherence to regimen.   LFTs are WNL   Lab Results   Component Value Date    ALT 26 12/24/2024    AST 20 12/24/2024    ALKPHOS 71 12/24/2024    BILITOT 0.5 12/24/2024     Medication Changes:  CONTINUE  Vascepa 2g BID  Atorvastatin 40mg daily

## 2025-02-03 NOTE — ASSESSMENT & PLAN NOTE
Most recent A1c of 6.3% demonstrates good diabetes control; however recent home BG readings have been elevated (-180). Patient took a 4 week break from Mounjaro after experiencing severe nausea and vomiting on 10mg dose. Has since restarted Mounjaro at 5mg with significant improvement in GI side effects. Patient self-discontinued Farxiga 5mg due to yeast infection symptoms. Will continue to hold Farxiga until greater blood glucose control is established. Discussed slower titration of Mounjaro to avoid recurrence of GI side effects. Patient agreeable to delaying Mounjaro increase and restarting pioglitazone today. Will restart lower dose of pioglitazone with goal to minimize pioglitazone necessity with time.    Plan:  CONTINUE   Metformin XL 2000 mg daily  Mounjaro 5mg once weekly  START  Pioglitazone 30mg once daily  STOP  Farxiga 5mg daily    Monitoring and Education  A1c due with next labs  Counseled on limiting sugary beverages and foods.   Improve adherence to oral medications  Discussed strategies to prevent constipation including eating fiber and using Miralax at first sign of constipation.  Educated on increasing protein consumption and decreasing consumption of carbs

## 2025-02-03 NOTE — PROGRESS NOTES
"Patient ID: Kaity Eric is a 69 y.o. female who presents for Diabetes    Referring Provider: Mei Taylor DO  PCP: Mei Taylor DO   Last visit with PCP: 12/16/2024 Next visit with PCP: 6/16/25    Endocrinologist: Dr. Osman Cr (next visit 3/5/2024)    Subjective     Today, patient is back from her trip to [place] and is back to her normal routine. Has resumed her medications, but stopped Farxiga 1.5 weeks ago due to vaginal itching. Reports sugars have been higher recently. Diet has improved, but states she had a cold last week and drank a lot of juice while she was sick. Restarted Mounjaro 5mg. Experienced some nausea and vomiting in the first few days after restarting, but has since felt \"back to normal\" on this lower dose.     Diabetes    Current Pharmacotherapy:   Metformin XL 2000 mg daily  Mounjaro 5mg once weekly    Discontinued: glimepiride, Jardiance 25mg, Farxiga 5mg    Diet  Encouraged patient to increase consumption of protein and vegetable and decrease consumption of carbohydrates. Patient working to eat 80g protein in a day. Tried protein shakes and powder- not palatable. Not hungry in the morning. Recommended she may try low sugar greek yogurt, protein-enriched oatmeal, [place] cakes.    Sugar cravings have not abated. More fruit and juices.     Current Weight 181 lb (228 lb when starting Mounjaro)    Exercise  Hairline fracture in foot has healed. Walking more.    SECONDARY PREVENTION  - Statin? Atorvastatin 40 mg   - ACE-I/ARB? Lisinopril 20 mg   - Aspirin? No    -The ASCVD Risk score (Clay MCDERMOTT, et al., 2019) failed to calculate for the following reasons:    The valid total cholesterol range is 130 to 320 mg/dL      Current monitoring regimen:   Patient is using: glucometer     SMBG Fasting Readings from   From 2/3: 165, 169, 189, 212, 187, 171, 181, 168, 200, 161   From 12/12: Am 148, 112, 116, 146, 151, 141, 158, 214, 175, 123  From 11/25: No pills (5 days/10) 119, 131, " 144, 156, 176, 173, 144, 167, 168, 137, 138.   From 10/28: couple days missed. Guests from out of town. 149, 174, 127, 129   From 9/23: 124, 108, 108, 161, 93, 116.   From 9/11: 166 208 196 147 135 184   8/1/24: 132, 169, 174, 144, 119, 131, 157, 166, 176, 159, 190, 90. 150, 183.   SMBG:   Noted that when readings are very high, she usually skipped her pills the day before     Pertinent PMH Review:  - PMH of Pancreatitis: no  - PMH/FH of Medullary Thyroid Cancer: no  - PMH of Retinopathy: no      VAF Application    Medication(s): Mounjaro, Vascepa, *Add Farxiga 11/25    Application response: [Approved]    Approved until: 6/28/2025    Additional information:  - Patient will get this prescription mailed to them from  pharmacies to receive medication at no cost  - Patient will need to re-enroll in this program prior to expiration date to maintain coverage    Review of Systems      Objective     LMP  (LMP Unknown)    BP Readings from Last 4 Encounters:   12/24/24 138/70   12/24/24 138/80   12/21/24 126/75   12/16/24 110/66      There were no vitals filed for this visit.     Labs  Lab Results   Component Value Date    BILITOT 0.5 12/24/2024    CALCIUM 8.7 12/24/2024    CO2 27 12/24/2024     12/24/2024    CREATININE 0.94 12/24/2024    GLUCOSE 185 (H) 12/24/2024    ALKPHOS 71 12/24/2024    K 3.6 12/24/2024    PROT 6.5 12/24/2024     12/24/2024    AST 20 12/24/2024    ALT 26 12/24/2024    BUN 15 12/24/2024    ANIONGAP 13 12/24/2024    ALBUMIN 4.1 12/24/2024    LIPASE 25 12/24/2024    GFRF 75 08/22/2023     Lab Results   Component Value Date    TRIG 152 (H) 09/26/2024    CHOL 105 09/26/2024    LDLCALC 36 09/26/2024    HDL 38.3 09/26/2024     Lab Results   Component Value Date    HGBA1C 6.3 (H) 09/26/2024       Current Outpatient Medications   Medication Instructions    atorvastatin (LIPITOR) 40 mg, oral, Daily    blood sugar diagnostic (OneTouch Ultra Test) strip test once daily    cyanocobalamin (VITAMIN  B-12) 2,000 mcg, Daily    ferrous sulfate (325 mg ferrous sulfate) 325 mg, 3 times weekly    icosapent ethyL (VASCEPA) 2 g, oral, 2 times daily (morning and late afternoon)    lidocaine (Lidoderm) 5 % patch 1 patch, transdermal, Daily, Remove & discard patch within 12 hours or as directed by MD.    lisinopriL-hydrochlorothiazide 20-25 mg tablet 1 tablet, oral, Daily    metFORMIN XR (GLUCOPHAGE-XR) 2,000 mg, oral, Daily    Mounjaro 5 mg, subcutaneous, Weekly    pioglitazone (ACTOS) 30 mg, oral, Daily    venlafaxine XR (EFFEXOR-XR) 75 mg, oral, Daily, Take with food.         Drug Interactions;  None at time of review    Assessment/Plan   Problem List Items Addressed This Visit       Hypertriglyceridemia     Triglycerides are slightly above goal (152) on Vascaepa 2g BID. Labs slightly worsened from last check in December 2023. Patient notes previous therapy interruption due to cost. Admits some poor adherence this year. Patient educated on importance of adherence to regimen.   LFTs are WNL   Lab Results   Component Value Date    ALT 26 12/24/2024    AST 20 12/24/2024    ALKPHOS 71 12/24/2024    BILITOT 0.5 12/24/2024     Medication Changes:  CONTINUE  Vascepa 2g BID  Atorvastatin 40mg daily         Type 2 diabetes mellitus without complication, without long-term current use of insulin (Multi)     Most recent A1c of 6.3% demonstrates good diabetes control; however recent home BG readings have been elevated (-180). Patient took a 4 week break from Mounjaro after experiencing severe nausea and vomiting on 10mg dose. Has since restarted Mounjaro at 5mg with significant improvement in GI side effects. Patient self-discontinued Farxiga 5mg due to yeast infection symptoms. Will continue to hold Farxiga until greater blood glucose control is established. Discussed slower titration of Mounjaro to avoid recurrence of GI side effects. Patient agreeable to delaying Mounjaro increase and restarting pioglitazone today. Will  restart lower dose of pioglitazone with goal to minimize pioglitazone necessity with time.    Plan:  CONTINUE   Metformin XL 2000 mg daily  Mounjaro 5mg once weekly  START  Pioglitazone 30mg once daily  STOP  Farxiga 5mg daily    Monitoring and Education  A1c due with next labs  Counseled on limiting sugary beverages and foods.   Improve adherence to oral medications  Discussed strategies to prevent constipation including eating fiber and using Miralax at first sign of constipation.  Educated on increasing protein consumption and decreasing consumption of carbs         Relevant Medications    pioglitazone (Actos) 30 mg tablet    tirzepatide (Mounjaro) 5 mg/0.5 mL pen injector    Other Relevant Orders    Referral to Clinical Pharmacy     Consider Mounjaro titration at visit with Dr. Cr on 3/5/25.     Communicated concerns for yeast infection with referring provider- Dr. Taylor. Requested that patient make appointment for symptom management.       Follow-up: 3/31/25 9:00am      Time spent with pt: Total length of time 30(minutes) of the encounter and more than 50% was spent counseling the patient.    Joceline Sepulveda, PharmD    Continue all meds under the continuation of care with the referring provider and clinical pharmacy team.

## 2025-02-04 ENCOUNTER — OFFICE VISIT (OUTPATIENT)
Dept: PRIMARY CARE | Facility: CLINIC | Age: 70
End: 2025-02-04
Payer: MEDICARE

## 2025-02-04 VITALS
SYSTOLIC BLOOD PRESSURE: 116 MMHG | HEART RATE: 72 BPM | DIASTOLIC BLOOD PRESSURE: 74 MMHG | TEMPERATURE: 97.4 F | OXYGEN SATURATION: 99 %

## 2025-02-04 DIAGNOSIS — K59.09 OTHER CONSTIPATION: ICD-10-CM

## 2025-02-04 DIAGNOSIS — N76.1 SUBACUTE VAGINITIS: Primary | ICD-10-CM

## 2025-02-04 DIAGNOSIS — N20.0 KIDNEY STONE: ICD-10-CM

## 2025-02-04 PROCEDURE — 99214 OFFICE O/P EST MOD 30 MIN: CPT | Performed by: FAMILY MEDICINE

## 2025-02-04 PROCEDURE — 3078F DIAST BP <80 MM HG: CPT | Performed by: FAMILY MEDICINE

## 2025-02-04 PROCEDURE — G2211 COMPLEX E/M VISIT ADD ON: HCPCS | Performed by: FAMILY MEDICINE

## 2025-02-04 PROCEDURE — 3074F SYST BP LT 130 MM HG: CPT | Performed by: FAMILY MEDICINE

## 2025-02-04 PROCEDURE — 1159F MED LIST DOCD IN RCRD: CPT | Performed by: FAMILY MEDICINE

## 2025-02-04 PROCEDURE — 1036F TOBACCO NON-USER: CPT | Performed by: FAMILY MEDICINE

## 2025-02-04 RX ORDER — DOCUSATE SODIUM 100 MG/1
100 CAPSULE, LIQUID FILLED ORAL 2 TIMES DAILY
Qty: 60 CAPSULE | Refills: 0 | Status: SHIPPED | OUTPATIENT
Start: 2025-02-04 | End: 2025-04-05

## 2025-02-04 RX ORDER — FLUCONAZOLE 150 MG/1
150 TABLET ORAL ONCE
Qty: 1 TABLET | Refills: 0 | Status: SHIPPED | OUTPATIENT
Start: 2025-02-04 | End: 2025-02-04

## 2025-02-04 ASSESSMENT — ENCOUNTER SYMPTOMS
PALPITATIONS: 0
POLYPHAGIA: 0
POLYDIPSIA: 0
NAUSEA: 0
BLOOD IN STOOL: 0
FEVER: 0
VOMITING: 0
DIARRHEA: 0
DYSURIA: 0
DIFFICULTY URINATING: 0
ABDOMINAL PAIN: 0
DYSPHORIC MOOD: 0
FATIGUE: 0
CONSTIPATION: 1
HEADACHES: 0
SHORTNESS OF BREATH: 0
MYALGIAS: 0
SLEEP DISTURBANCE: 0
DIZZINESS: 0

## 2025-02-04 NOTE — PATIENT INSTRUCTIONS
Recommend high fiber diet.     Recommend colace use x 1-2 weeks, then as needed    Start diflucan    Return as scheduled, sooner if needed

## 2025-02-04 NOTE — PROGRESS NOTES
"Subjective   Patient ID: Kaity Eric is a 69 y.o. female who presents for Vaginal Itching (Discuss Yeast Infection caused by med) x couple wks and multiple issues.     Vaginal Itching  Associated symptoms include constipation. Pertinent negatives include no abdominal pain, diarrhea, dysuria, fever, headaches, nausea, rash or vomiting.     Constipation; went to ER after last ov for worsening abd pain. CT abd showed diffuse constipation. Rec'd enema in ER. Had some relief. Having fairly regular bowel movements. No bld. Was told to follow up w/ GI but pt did not follow up.     Vaginitis: onset x couple weeks. Believes related to farxiga. Some thick \"cottage cheese\" discharge. Diflucan helped in past. No dysuria.     Kidney stone: incidental finding on CT abd on rt side. No hematuria or flank pain    Review of Systems   Constitutional:  Negative for fatigue and fever.   Eyes:  Negative for visual disturbance.   Respiratory:  Negative for shortness of breath.    Cardiovascular:  Negative for chest pain and palpitations.   Gastrointestinal:  Positive for constipation. Negative for abdominal pain, blood in stool, diarrhea, nausea and vomiting.   Endocrine: Negative for polydipsia, polyphagia and polyuria.   Genitourinary:  Negative for difficulty urinating and dysuria.   Musculoskeletal:  Negative for myalgias.   Skin:  Negative for rash.   Neurological:  Negative for dizziness and headaches.   Psychiatric/Behavioral:  Negative for dysphoric mood and sleep disturbance.        Objective   /74   Pulse 72   Temp 36.3 °C (97.4 °F)   LMP  (LMP Unknown)   SpO2 99%     Physical Exam  Vitals and nursing note reviewed.   Constitutional:       General: She is not in acute distress.     Appearance: Normal appearance. She is not toxic-appearing.   HENT:      Head: Normocephalic.      Mouth/Throat:      Pharynx: Oropharynx is clear.   Eyes:      General: No scleral icterus.     Pupils: Pupils are equal, round, and " reactive to light.   Cardiovascular:      Rate and Rhythm: Normal rate and regular rhythm.      Heart sounds: No murmur heard.  Pulmonary:      Effort: Pulmonary effort is normal. No respiratory distress.      Breath sounds: Normal breath sounds.   Abdominal:      Palpations: Abdomen is soft.      Tenderness: There is no abdominal tenderness. There is no right CVA tenderness, left CVA tenderness or guarding.   Musculoskeletal:         General: No tenderness.      Right lower leg: No edema.      Left lower leg: No edema.   Skin:     General: Skin is warm.   Neurological:      General: No focal deficit present.      Mental Status: She is alert.      Cranial Nerves: No cranial nerve deficit.   Psychiatric:         Mood and Affect: Mood normal.         Assessment/Plan   Problem List Items Addressed This Visit             ICD-10-CM    Constipation K59.00    Relevant Medications    docusate sodium (Colace) 100 mg capsule     Other Visit Diagnoses         Codes    Subacute vaginitis    -  Primary N76.1    Relevant Medications    fluconazole (Diflucan) 150 mg tablet    Kidney stone     N20.0        Reviewed ER reports. Recommendations given. Discussed potential interactions w/ new med. Rec pt hold statin while on med

## 2025-02-05 ENCOUNTER — PHARMACY VISIT (OUTPATIENT)
Dept: PHARMACY | Facility: CLINIC | Age: 70
End: 2025-02-05
Payer: COMMERCIAL

## 2025-02-10 NOTE — PROGRESS NOTES
Chief Complaint/Reason for Visit:   Patient is coming in today as a 6 month Cardiovascular follow up.      History Of Present Illness:    Ms Eric is coming today as a 6-month cardiovascular follow-up.  We have followed this patient previously for mitral regurgitation, dyslipidemia, and hypertension.  Most recent lipid labs were done in September, 2024 showing an LDL of 36, triglycerides 152.    Patient comes in today generally feeling well.  Patient denies any chest pain, pressure, palpitations, orthopnea, or edema.  She had an ER visit in December for constipation.  She is working with dosing of Mounjaro with clinical pharmacist and is now on new bowel medication.  She still feels she is constipated at times.  I am recommending stopping hydrochlorothiazide.  Patient reports that she has lost approximately 40 pounds..    Past Medical History:  She has a past medical history of CN (constipation) (10/15/2008), Combined forms of age-related cataract of left eye (11/06/2020), Dry eye syndrome of both eyes (11/06/2020), History of phacoemulsification of cataract with intraocular lens implantation, right (01/08/2021), Personal history of diseases of the skin and subcutaneous tissue, and Personal history of diseases of the skin and subcutaneous tissue.    Past Surgical History:  She has a past surgical history that includes Other surgical history (07/12/2019) and Other surgical history (10/13/2020).      Social History:  She reports that she has never smoked. She has never used smokeless tobacco. She reports that she does not currently use alcohol. She reports that she does not use drugs.    Family History:  Family History   Problem Relation Name Age of Onset    Coronary artery disease Mother      Heart attack Father      Uterine cancer Sister      Heart attack Brother      Melanoma Other          Allergies:  Jardiance [empagliflozin], Latex, and Red dye    Medications:  Current Outpatient Medications   Medication  "Instructions    atorvastatin (LIPITOR) 40 mg, oral, Daily    blood sugar diagnostic (OneTouch Ultra Test) strip test once daily    cyanocobalamin (VITAMIN B-12) 2,000 mcg, Daily    docusate sodium (COLACE) 100 mg, oral, 2 times daily    ferrous sulfate (325 mg ferrous sulfate) 325 mg, 3 times weekly    icosapent ethyL (VASCEPA) 2 g, oral, 2 times daily (morning and late afternoon)    lisinopriL-hydrochlorothiazide 20-25 mg tablet 1 tablet, oral, Daily    metFORMIN XR (GLUCOPHAGE-XR) 2,000 mg, oral, Daily    Mounjaro 5 mg, subcutaneous, Weekly    pioglitazone (ACTOS) 30 mg, oral, Daily    venlafaxine XR (EFFEXOR-XR) 75 mg, oral, Daily, Take with food.       Review of Systems:  GEN: feeling well. no tiredness, fatigue, weight gain/loss, night sweats  HEENT: no vision loss, sore throat, swallowing problems, has vertigo  GI:  diarrhea, bleeding in stools or dark stools. + N/V with higher dose of Mounjaro   PULM: no SOB, wheezing, cough  NEURO: no focal weakness or vision changes,   RENAL: no blood in the urine  ID: no fever/chills, nausea, vomiting, dysuria, diarrhea  VASC: no bleeding  PSYCH: no depression/anxiety    Vitals:   Visit Vitals  /76 (BP Location: Left arm, Patient Position: Sitting, BP Cuff Size: Large adult)   Pulse 60   Ht 1.651 m (5' 5\")   Wt 86.6 kg (191 lb)   LMP  (LMP Unknown)   SpO2 97%   BMI 31.78 kg/m²   OB Status Postmenopausal   Smoking Status Never   BSA 1.99 m²        Physical Exam:  GEN: well-nourished, no acute distress  SKIN: no rashes, well perfused  HEENT: normocephalic and atraumatic, no neck swelling  ORAL: lips and oropharynx normal appearance  CV: Normal S1 S2 regular, no carotid bruits, trace ankle edema, no venous prominence in the neck  RESP:lungs are clear, thorax symmetrical, normal respiratory effort and rate, no accessory muscle use  MSK: normal appearing muscle tone, normal range of motion, no joint swelling  NEURO: no gross focal neuro deficit, oriented to time, place, " and person  ABD: nondistended  PSYCH: normal mood and affect      Last Labs:  CBC -  Lab Results   Component Value Date    WBC 12.5 (H) 12/24/2024    HGB 11.9 (L) 12/24/2024    HCT 36.0 12/24/2024    MCV 92 12/24/2024     12/24/2024     Lab Results   Component Value Date    GLUCOSE 183 (H) 02/10/2025    CALCIUM 9.3 02/10/2025     02/10/2025    K 4.4 02/10/2025    CO2 29 02/10/2025     02/10/2025    BUN 10 02/10/2025    CREATININE 0.78 02/10/2025      CMP -  Lab Results   Component Value Date    CALCIUM 9.3 02/10/2025    PROT 6.3 02/10/2025    ALBUMIN 4.0 02/10/2025    AST 18 02/10/2025    ALT 13 02/10/2025    ALKPHOS 92 02/10/2025    BILITOT 0.6 02/10/2025       LIPID PANEL -   Lab Results   Component Value Date    CHOL 158 02/10/2025    TRIG 217 (H) 02/10/2025    HDL 44 (L) 02/10/2025    CHHDL 3.6 02/10/2025    LDLF 28 08/22/2023    VLDL 30 09/26/2024    NHDL 114 02/10/2025       Lab Results   Component Value Date    HGBA1C 8.0 (H) 02/10/2025    HGBA1C 7.2 (A) 01/08/2024       Last Cardiology Tests:    CONCLUSIONS:   1. The left ventricular systolic function is normal with a 60% estimated ejection fraction.   2. No evidence of mitral valve prolapse.       Lab review: I have personally reviewed the laboratory result(s)     Assessment/Plan:  Hypertension: Blood pressure today shows good control at 122/76.  At times she does run lower.  With her issues with constipation I am discontinuing the hydrochlorothiazide portion of her blood pressure medication.  She will continue on lisinopril 20 mg daily.  Patient was congratulated on losing weight which may also allow us to use less blood pressure lowering medication.  Patient is aware that our blood pressure goal is less than 130/80 consistently.    Dyslipidemia: Patient tells me that with the nausea and vomiting along with constipation while on the higher dose of Mounjaro she has not been taking her cholesterol-lowering medication routinely.  She was  having trouble tolerating Vascepa.  She has continued on atorvastatin.  Her lipid labs done yesterday shows triglycerides 217, LDL 83.  Her lipid profile was better when she was able to take her Vascepa as directed in addition to atorvastatin.  She will try to get back on her previous dose of Vascepa once her nausea improves.  She is working on eating a heart healthy low-cholesterol, low-salt diet.    Obesity: Patient currently is on Mounjaro and has lost approximately 40 pounds.  Her medication is being managed by the clinical pharmacist.  She has had some issues with nausea, vomiting, and significant constipation.  She will discuss these issues with the pharmacist during her upcoming virtual appointment.    Patient will be scheduled to follow-up with Dr. Hoffmann in 6 months.  Patient instructed to call with any cardiovascular complaints. All questions were answered.       Dragon dictation was utilized to create this document. Quite often unanticipated grammatical, syntax,  and other interpretive errors are inadvertently transcribed by the computer software.  Please disregard these errors.  Please excuse any errors that have escaped final proofreading.          Elodia Arrieta, APRN-CNP

## 2025-02-11 ENCOUNTER — APPOINTMENT (OUTPATIENT)
Dept: CARDIOLOGY | Facility: HOSPITAL | Age: 70
End: 2025-02-11
Payer: MEDICARE

## 2025-02-11 VITALS
OXYGEN SATURATION: 97 % | HEIGHT: 65 IN | HEART RATE: 60 BPM | WEIGHT: 191 LBS | BODY MASS INDEX: 31.82 KG/M2 | DIASTOLIC BLOOD PRESSURE: 76 MMHG | SYSTOLIC BLOOD PRESSURE: 122 MMHG

## 2025-02-11 DIAGNOSIS — E66.811 OBESITY (BMI 30.0-34.9): ICD-10-CM

## 2025-02-11 DIAGNOSIS — I10 PRIMARY HYPERTENSION: Primary | Chronic | ICD-10-CM

## 2025-02-11 DIAGNOSIS — E78.5 HYPERLIPIDEMIA, UNSPECIFIED HYPERLIPIDEMIA TYPE: ICD-10-CM

## 2025-02-11 DIAGNOSIS — E11.9 TYPE 2 DIABETES MELLITUS WITHOUT COMPLICATION, WITHOUT LONG-TERM CURRENT USE OF INSULIN (MULTI): ICD-10-CM

## 2025-02-11 LAB
ALBUMIN SERPL-MCNC: 4 G/DL (ref 3.6–5.1)
ALP SERPL-CCNC: 92 U/L (ref 37–153)
ALT SERPL-CCNC: 13 U/L (ref 6–29)
ANION GAP SERPL CALCULATED.4IONS-SCNC: 7 MMOL/L (CALC) (ref 7–17)
AST SERPL-CCNC: 18 U/L (ref 10–35)
BILIRUB SERPL-MCNC: 0.6 MG/DL (ref 0.2–1.2)
BUN SERPL-MCNC: 10 MG/DL (ref 7–25)
CALCIUM SERPL-MCNC: 9.3 MG/DL (ref 8.6–10.4)
CHLORIDE SERPL-SCNC: 104 MMOL/L (ref 98–110)
CHOLEST SERPL-MCNC: 158 MG/DL
CHOLEST/HDLC SERPL: 3.6 (CALC)
CO2 SERPL-SCNC: 29 MMOL/L (ref 20–32)
CREAT SERPL-MCNC: 0.78 MG/DL (ref 0.5–1.05)
EGFRCR SERPLBLD CKD-EPI 2021: 83 ML/MIN/1.73M2
EST. AVERAGE GLUCOSE BLD GHB EST-MCNC: 177 MG/DL
EST. AVERAGE GLUCOSE BLD GHB EST-MCNC: 183 MG/DL
EST. AVERAGE GLUCOSE BLD GHB EST-SCNC: 10.1 MMOL/L
EST. AVERAGE GLUCOSE BLD GHB EST-SCNC: 9.8 MMOL/L
GLUCOSE SERPL-MCNC: 183 MG/DL (ref 65–99)
HBA1C MFR BLD: 7.8 % OF TOTAL HGB
HBA1C MFR BLD: 8 % OF TOTAL HGB
HDLC SERPL-MCNC: 44 MG/DL
LDLC SERPL CALC-MCNC: 83 MG/DL (CALC)
NONHDLC SERPL-MCNC: 114 MG/DL (CALC)
POTASSIUM SERPL-SCNC: 4.4 MMOL/L (ref 3.5–5.3)
PROT SERPL-MCNC: 6.3 G/DL (ref 6.1–8.1)
SODIUM SERPL-SCNC: 140 MMOL/L (ref 135–146)
TRIGL SERPL-MCNC: 217 MG/DL

## 2025-02-11 PROCEDURE — 3074F SYST BP LT 130 MM HG: CPT | Performed by: NURSE PRACTITIONER

## 2025-02-11 PROCEDURE — 99213 OFFICE O/P EST LOW 20 MIN: CPT | Performed by: NURSE PRACTITIONER

## 2025-02-11 PROCEDURE — 3078F DIAST BP <80 MM HG: CPT | Performed by: NURSE PRACTITIONER

## 2025-02-11 PROCEDURE — 3008F BODY MASS INDEX DOCD: CPT | Performed by: NURSE PRACTITIONER

## 2025-02-11 PROCEDURE — 4010F ACE/ARB THERAPY RXD/TAKEN: CPT | Performed by: NURSE PRACTITIONER

## 2025-02-11 PROCEDURE — 1159F MED LIST DOCD IN RCRD: CPT | Performed by: NURSE PRACTITIONER

## 2025-02-11 PROCEDURE — 1036F TOBACCO NON-USER: CPT | Performed by: NURSE PRACTITIONER

## 2025-02-11 PROCEDURE — 1160F RVW MEDS BY RX/DR IN RCRD: CPT | Performed by: NURSE PRACTITIONER

## 2025-02-11 RX ORDER — ATORVASTATIN CALCIUM 40 MG/1
40 TABLET, FILM COATED ORAL DAILY
Qty: 90 TABLET | Refills: 3 | Status: SHIPPED | OUTPATIENT
Start: 2025-02-11 | End: 2026-02-11

## 2025-02-11 RX ORDER — LISINOPRIL 20 MG/1
20 TABLET ORAL DAILY
Qty: 90 TABLET | Refills: 3 | Status: SHIPPED | OUTPATIENT
Start: 2025-02-11 | End: 2026-02-11

## 2025-02-11 NOTE — PATIENT INSTRUCTIONS
STOP Lisinopril/hydrochlorothiazide  START Lisinopril 20 mg daily.  Continue on the rest of your medications  Heart healthy, low sodium diet  Mediterranean diet is recommended  Follow up with Dr Hoffmann in 6 months

## 2025-03-01 LAB
ALBUMIN/CREAT UR: 9 MG/G CREAT
CREAT UR-MCNC: 91 MG/DL (ref 20–275)
MICROALBUMIN UR-MCNC: 0.8 MG/DL

## 2025-03-04 NOTE — PROGRESS NOTES
"Jose Juan Eric is a 69 y.o. female who presents for follow-up of Type 2 diabetes mellitus.     She has severe constipation.  She did not have Mounjaro for last two weeks of December and first two weeks in January.      She can have urinary retention due to constipation     Known complications due to diabetes included obesity    Cardiovascular risk factors include advanced age (older than 55 for men, 65 for women) and obesity (BMI >= 30 kg/m2). The patient is on an ACE inhibitor or angiotensin II receptor blocker.  The patient has not been previously hospitalized due to diabetic ketoacidosis.     Current symptoms/problems include none. Her clinical course has been stable.     The patient is currently checking the blood glucose one time per day.    Patient is using: glucometer                Hypoglycemia frequency: Yes    Hypoglycemia awareness:  Yes when 100mg/dL      Current Medication Regimen  Pioglitazone 30mg once daily   Metformin 2 grams daily   Mounjaro 5mg subcutaneous once weekly      Exercise regimen -denies    MEALS: stops when satisfied   Breakfast -  can omit   Lunch - high protein oatmeal, meat sandiwch, fruit   Dinner 7pm - salad, meat, fish, corn, ptooates   Snacks - candy as a calming technique         Review of Systems   Gastrointestinal:  Positive for constipation.   Genitourinary:         Nocturia x 1-2    All other systems reviewed and are negative.    Objective   Visit Vitals  LMP  (LMP Unknown)   OB Status Postmenopausal   Smoking Status Never     Visit Vitals  /72 (BP Location: Right arm)   Ht 1.651 m (5' 5\")   Wt 86.5 kg (190 lb 9.6 oz)   LMP  (LMP Unknown)   BMI 31.72 kg/m²   OB Status Postmenopausal   Smoking Status Never   BSA 1.99 m²           Physical Exam  Vitals and nursing note reviewed.   Constitutional:       General: She is not in acute distress.     Appearance: Normal appearance. She is obese.   HENT:      Head: Normocephalic and atraumatic.      Nose: Nose " normal.      Mouth/Throat:      Mouth: Mucous membranes are moist.   Eyes:      Extraocular Movements: Extraocular movements intact.   Pulmonary:      Effort: Pulmonary effort is normal.   Musculoskeletal:         General: Normal range of motion.      Right foot: Normal range of motion. No deformity.      Left foot: Normal range of motion. No deformity.   Feet:      Right foot:      Skin integrity: Skin integrity normal. No ulcer or blister.      Toenail Condition: Right toenails are normal.      Left foot:      Skin integrity: Skin integrity normal. No ulcer or blister.      Toenail Condition: Left toenails are normal.   Neurological:      Mental Status: She is alert and oriented to person, place, and time.   Psychiatric:         Mood and Affect: Mood normal.         Lab Review  GLUCOSE (mg/dL)   Date Value   02/10/2025 183 (H)     Glucose (mg/dL)   Date Value   12/24/2024 185 (H)   11/23/2024 158 (H)   09/26/2024 104 (H)     POC HEMOGLOBIN A1c (%)   Date Value   01/08/2024 7.2 (A)     HEMOGLOBIN A1c (% of total Hgb)   Date Value   02/10/2025 8.0 (H)   02/10/2025 7.8 (H)     Hemoglobin A1C (%)   Date Value   09/26/2024 6.3 (H)   05/02/2024 8.8 (H)   08/22/2023 8.0 (A)   04/27/2023 8.8 (A)     CARBON DIOXIDE (mmol/L)   Date Value   02/10/2025 29     Bicarbonate (mmol/L)   Date Value   12/24/2024 27   11/23/2024 28   09/26/2024 29     UREA NITROGEN (BUN) (mg/dL)   Date Value   02/10/2025 10     Urea Nitrogen (mg/dL)   Date Value   12/24/2024 15   11/23/2024 9   09/26/2024 12     Creatinine (mg/dL)   Date Value   12/24/2024 0.94   11/23/2024 0.83   09/26/2024 0.78     CREATININE (mg/dL)   Date Value   02/10/2025 0.78     Lab Results   Component Value Date    CHOL 158 02/10/2025    CHOL 105 09/26/2024    CHOL 108 12/04/2023     Lab Results   Component Value Date    HDL 44 (L) 02/10/2025    HDL 38.3 09/26/2024    HDL 44.2 12/04/2023     Lab Results   Component Value Date    LDLCALC 83 02/10/2025    LDLCALC 36 09/26/2024     LDLCALC 46 12/04/2023     Lab Results   Component Value Date    TRIG 217 (H) 02/10/2025    TRIG 152 (H) 09/26/2024    TRIG 87 12/04/2023       Health Maintenance:   Foot Exam: May 2, 2024  Eye Exam: October 2024  Urine Albumin: September 26, 2024    Assessment/Plan   69-year-old female presents for follow up for type 2 diabetes mellitus. Her blood pressure is at goal. She is noted to be on a statin.    Type 2 diabetes mellitus without complication, without long-term current use of insulin (Multi)    To continue Piogiltazone 30 mg once daily   To increase Mounjaro to 7.5mg subcutaneous once weekly   To continue Metformin 2 grams daily (take with food)  Counseled that the goal A1C should be 7%   Counseled glycemic control is warranted to prevent microvascular complications  Please eliminate the use of candy as a calming technique   For follow up in 4- 5 months

## 2025-03-05 ENCOUNTER — APPOINTMENT (OUTPATIENT)
Dept: ENDOCRINOLOGY | Facility: CLINIC | Age: 70
End: 2025-03-05
Payer: MEDICARE

## 2025-03-05 VITALS
BODY MASS INDEX: 31.75 KG/M2 | SYSTOLIC BLOOD PRESSURE: 122 MMHG | WEIGHT: 190.6 LBS | HEIGHT: 65 IN | DIASTOLIC BLOOD PRESSURE: 72 MMHG

## 2025-03-05 DIAGNOSIS — E11.9 TYPE 2 DIABETES MELLITUS WITHOUT COMPLICATION, WITHOUT LONG-TERM CURRENT USE OF INSULIN (MULTI): Primary | ICD-10-CM

## 2025-03-05 DIAGNOSIS — E11.9 TYPE 2 DIABETES MELLITUS WITHOUT COMPLICATION, WITHOUT LONG-TERM CURRENT USE OF INSULIN (MULTI): ICD-10-CM

## 2025-03-05 PROCEDURE — RXMED WILLOW AMBULATORY MEDICATION CHARGE

## 2025-03-05 PROCEDURE — 3074F SYST BP LT 130 MM HG: CPT | Performed by: INTERNAL MEDICINE

## 2025-03-05 PROCEDURE — 99214 OFFICE O/P EST MOD 30 MIN: CPT | Performed by: INTERNAL MEDICINE

## 2025-03-05 PROCEDURE — 4010F ACE/ARB THERAPY RXD/TAKEN: CPT | Performed by: INTERNAL MEDICINE

## 2025-03-05 PROCEDURE — 3008F BODY MASS INDEX DOCD: CPT | Performed by: INTERNAL MEDICINE

## 2025-03-05 PROCEDURE — 1159F MED LIST DOCD IN RCRD: CPT | Performed by: INTERNAL MEDICINE

## 2025-03-05 PROCEDURE — G2211 COMPLEX E/M VISIT ADD ON: HCPCS | Performed by: INTERNAL MEDICINE

## 2025-03-05 PROCEDURE — 1160F RVW MEDS BY RX/DR IN RCRD: CPT | Performed by: INTERNAL MEDICINE

## 2025-03-05 PROCEDURE — 3078F DIAST BP <80 MM HG: CPT | Performed by: INTERNAL MEDICINE

## 2025-03-05 RX ORDER — TIRZEPATIDE 7.5 MG/.5ML
7.5 INJECTION, SOLUTION SUBCUTANEOUS WEEKLY
Qty: 2 ML | Refills: 5 | Status: SHIPPED | OUTPATIENT
Start: 2025-03-05 | End: 2025-03-05

## 2025-03-05 RX ORDER — TIRZEPATIDE 7.5 MG/.5ML
INJECTION, SOLUTION SUBCUTANEOUS
COMMUNITY
End: 2025-03-05 | Stop reason: SDUPTHER

## 2025-03-05 RX ORDER — TIRZEPATIDE 7.5 MG/.5ML
7.5 INJECTION, SOLUTION SUBCUTANEOUS WEEKLY
Qty: 2 ML | Refills: 1 | Status: SHIPPED | OUTPATIENT
Start: 2025-03-05

## 2025-03-05 RX ORDER — TIRZEPATIDE 7.5 MG/.5ML
7.5 INJECTION, SOLUTION SUBCUTANEOUS WEEKLY
Qty: 2 ML | Refills: 5 | Status: SHIPPED | OUTPATIENT
Start: 2025-03-05 | End: 2025-03-05 | Stop reason: DRUGHIGH

## 2025-03-05 ASSESSMENT — ENCOUNTER SYMPTOMS: CONSTIPATION: 1

## 2025-03-06 ENCOUNTER — PHARMACY VISIT (OUTPATIENT)
Dept: PHARMACY | Facility: CLINIC | Age: 70
End: 2025-03-06
Payer: COMMERCIAL

## 2025-03-07 NOTE — ASSESSMENT & PLAN NOTE
To continue Piogiltazone 30 mg once daily   To increase Mounjaro to 7.5mg subcutaneous once weekly   To continue Metformin 2 grams daily (take with food)  Counseled that the goal A1C should be 7%   Counseled glycemic control is warranted to prevent microvascular complications  Please eliminate the use of candy as a calming technique   For follow up in 4- 5 months

## 2025-03-31 ENCOUNTER — APPOINTMENT (OUTPATIENT)
Dept: PHARMACY | Facility: HOSPITAL | Age: 70
End: 2025-03-31
Payer: MEDICARE

## 2025-03-31 DIAGNOSIS — E11.9 TYPE 2 DIABETES MELLITUS WITHOUT COMPLICATION, WITHOUT LONG-TERM CURRENT USE OF INSULIN: ICD-10-CM

## 2025-03-31 PROCEDURE — RXMED WILLOW AMBULATORY MEDICATION CHARGE

## 2025-03-31 RX ORDER — TIRZEPATIDE 10 MG/.5ML
10 INJECTION, SOLUTION SUBCUTANEOUS WEEKLY
Qty: 2 ML | Refills: 1 | Status: SHIPPED | OUTPATIENT
Start: 2025-03-31

## 2025-03-31 RX ORDER — METFORMIN HYDROCHLORIDE 500 MG/1
2000 TABLET, EXTENDED RELEASE ORAL DAILY
Qty: 360 TABLET | Refills: 1 | Status: SHIPPED | OUTPATIENT
Start: 2025-03-31 | End: 2025-09-27

## 2025-03-31 NOTE — PROGRESS NOTES
"Patient ID: Kaity Eric is a 69 y.o. female who presents for Diabetes    Referring Provider: Mei Taylor DO  PCP: Mei Taylor DO   Last visit with PCP: 12/16/2024 Next visit with PCP: 6/16/25    Endocrinologist: Dr. Osman Cr (next visit 9/17/25)    Note- transitioning referral from Dr. Taylor to Dr. Cr    Subjective     Today, patient reports that she is tolerating Mounjaro 7.5mg very well; however, does not feel the same appetite suppression or blood glucose control that she felt when she took this dose previously. Denies any upset stomach, N/V/D. Completed 4 doses. Feels \"ravenous\" has \"hunger pains\". Diet has slipped- eating candy.    States she stopped hydrochlorothiazide.     Weight increased to 190lb.    Diabetes    Current Pharmacotherapy:   Metformin XL 2000 mg daily  Pioglitazone 30mg daily  Mounjaro 7.5mg once weekly    Discontinued: glimepiride, Jardiance 25mg, Farxiga 5mg    Diet  Encouraged patient to increase consumption of protein and vegetable and decrease consumption of carbohydrates. Patient working to eat 80g protein in a day. Tried protein shakes and powder- not palatable. Not hungry in the morning. Recommended she may try low sugar greek yogurt, protein-enriched oatmeal, [place] cakes.    Sugar cravings have not abated. More fruit and juices.     Current Weight 190 lb (lowest 181lb on 10mg mounjaro) (228 lb when starting Mounjaro)    Exerciseno  Hairline fracture in foot has healed. Walking more. Not back to biking    SECONDARY PREVENTION  - Statin? Atorvastatin 40 mg   - ACE-I/ARB? Lisinopril 20 mg   - Aspirin? No    -The 10-year ASCVD risk score (Clay MCDERMOTT, et al., 2019) is: 15.2%    Values used to calculate the score:      Age: 69 years      Sex: Female      Is Non- : No      Diabetic: Yes      Tobacco smoker: No      Systolic Blood Pressure: 122 mmHg      Is BP treated: Yes      HDL Cholesterol: 44 mg/dL      Total Cholesterol: 158 mg/dL    "   Current monitoring regimen:   Patient is using: glucometer     SMBG Fasting Readings from   From 3/21: 213, 135, 176, 146, 180, 179, 190, 199, 145, 147  From 2/3: 165, 169, 189, 212, 187, 171, 181, 168, 200, 161   From 12/12: Am 148, 112, 116, 146, 151, 141, 158, 214, 175, 123  From 11/25: No pills (5 days/10) 119, 131, 144, 156, 176, 173, 144, 167, 168, 137, 138.   From 10/28: couple days missed. Guests from out of town. 149, 174, 127, 129   From 9/23: 124, 108, 108, 161, 93, 116.   From 9/11: 166 208 196 147 135 184   8/1/24: 132, 169, 174, 144, 119, 131, 157, 166, 176, 159, 190, 90. 150, 183.   SMBG:   Noted that when readings are very high, she usually skipped her pills the day before     Pertinent PMH Review:  - PMH of Pancreatitis: no  - PMH/FH of Medullary Thyroid Cancer: no  - PMH of Retinopathy: no      VAF Application    Medication(s): Mounjaro, Vascepa    Application response: [Approved]    Approved until: 6/28/2025    Additional information:  - Patient will get this prescription mailed to them from  pharmacies to receive medication at no cost  - Patient will need to re-enroll in this program prior to expiration date to maintain coverage    Review of Systems      Objective     LMP  (LMP Unknown)    BP Readings from Last 4 Encounters:   03/05/25 122/72   02/11/25 122/76   02/04/25 116/74   12/24/24 138/70      There were no vitals filed for this visit.     Labs  Lab Results   Component Value Date    BILITOT 0.6 02/10/2025    CALCIUM 9.3 02/10/2025    CO2 29 02/10/2025     02/10/2025    CREATININE 0.78 02/10/2025    GLUCOSE 183 (H) 02/10/2025    ALKPHOS 92 02/10/2025    K 4.4 02/10/2025    PROT 6.3 02/10/2025     02/10/2025    AST 18 02/10/2025    ALT 13 02/10/2025    BUN 10 02/10/2025    ANIONGAP 7 02/10/2025    ALBUMIN 4.0 02/10/2025    LIPASE 25 12/24/2024    GFRF 75 08/22/2023     Lab Results   Component Value Date    TRIG 217 (H) 02/10/2025    CHOL 158 02/10/2025    LDLCALC 83  "02/10/2025    HDL 44 (L) 02/10/2025     Lab Results   Component Value Date    HGBA1C 8.0 (H) 02/10/2025       Current Outpatient Medications   Medication Instructions    atorvastatin (LIPITOR) 40 mg, oral, Daily    blood sugar diagnostic (OneTouch Ultra Test) strip test once daily    cyanocobalamin (VITAMIN B-12) 2,000 mcg, Daily    docusate sodium (COLACE) 100 mg, oral, 2 times daily    ferrous sulfate 325 mg, 3 times weekly    icosapent ethyL (VASCEPA) 2 g, oral, 2 times daily (morning and late afternoon)    lisinopril 20 mg, oral, Daily    metFORMIN XR (GLUCOPHAGE-XR) 2,000 mg, oral, Daily    Mounjaro 10 mg, subcutaneous, Weekly    pioglitazone (ACTOS) 30 mg, oral, Daily    venlafaxine XR (EFFEXOR-XR) 75 mg, oral, Daily, Take with food.         Drug Interactions;  None at time of review    Assessment/Plan   Problem List Items Addressed This Visit       Type 2 diabetes mellitus without complication, without long-term current use of insulin (Multi)     Most recent A1c worsened to 8% from 6.3% after taking a break from Mounjaro and restarting titration due to severity of side effects at 10mg dose. Now back on Mounjaro 7.5 mg, but recent home BG readings have been elevated (-200). Patient feels like current pen is a \"placebo\"- does not feel any side effects but also no appetite suppression, BG control. Patient wants to try going back to 10mg Mounjaro. Will reduce dose back to 7.5mg if side effects recur.    Plan:  CONTINUE   Metformin XL 2000 mg daily  Pioglitazone 30mg once daily  INCREASE  Mounjaro 10mg weekly    Monitoring and Education  Counseled on limiting sugary beverages and foods.   Improve adherence to oral medications  Discussed strategies to prevent constipation including eating fiber and using Miralax at first sign of constipation.  Educated on increasing protein consumption and decreasing consumption of carbs         Relevant Medications    metFORMIN  mg 24 hr tablet    tirzepatide " (Mounjaro) 10 mg/0.5 mL pen injector    Other Relevant Orders    Follow up in Endocrinology Pharmacy       Follow-up: 4/28/25 8:30am - ok to call early.      Time spent with pt: Total length of time 20(minutes) of the encounter and more than 50% was spent counseling the patient.    Joceline Sepulveda, PharmD    Continue all meds under the continuation of care with the referring provider and clinical pharmacy team.

## 2025-03-31 NOTE — ASSESSMENT & PLAN NOTE
"Most recent A1c worsened to 8% from 6.3% after taking a break from Mounjaro and restarting titration due to severity of side effects at 10mg dose. Now back on Mounjaro 7.5 mg, but recent home BG readings have been elevated (-200). Patient feels like current pen is a \"placebo\"- does not feel any side effects but also no appetite suppression, BG control. Patient wants to try going back to 10mg Mounjaro. Will reduce dose back to 7.5mg if side effects recur.    Plan:  CONTINUE   Metformin XL 2000 mg daily  Pioglitazone 30mg once daily  INCREASE  Mounjaro 10mg weekly    Monitoring and Education  Counseled on limiting sugary beverages and foods.   Improve adherence to oral medications  Discussed strategies to prevent constipation including eating fiber and using Miralax at first sign of constipation.  Educated on increasing protein consumption and decreasing consumption of carbs  "

## 2025-04-01 ENCOUNTER — PHARMACY VISIT (OUTPATIENT)
Dept: PHARMACY | Facility: CLINIC | Age: 70
End: 2025-04-01
Payer: COMMERCIAL

## 2025-04-02 ENCOUNTER — PHARMACY VISIT (OUTPATIENT)
Dept: PHARMACY | Facility: CLINIC | Age: 70
End: 2025-04-02

## 2025-04-27 NOTE — PROGRESS NOTES
"Patient ID: Kaity Eric is a 69 y.o. female who presents for Diabetes    Referring Provider: Arthur Vargas*  PCP: Mei Taylor DO   Last visit with PCP: 12/16/2024 Next visit with PCP: 6/16/25    Endocrinologist: Dr. Osman Cr (next visit 9/17/25)    Note- transitioning referral from Dr. Taylor to Dr. Cr    Subjective     Today, patient reports that she is tolerating Mounjaro 10mg very well; however, does not feel the same appetite suppression or blood glucose control that she felt when she took this dose previously. Denies any upset stomach, N/V/D. Completed 4 doses. Feels \"ravenous\" has \"hunger pains\". Diet has slipped- eating candy.    States she stopped hydrochlorothiazide.     Weight increased to 195b.      Diabetes    Current Pharmacotherapy:   Metformin XL 2000 mg daily  Pioglitazone 30mg daily  Mounjaro 10mg once weekly (last one this morning)     Discontinued: glimepiride, Jardiance 25mg, Farxiga 5mg    Diet  Encouraged patient to increase consumption of protein and vegetable and decrease consumption of carbohydrates. Patient working to eat 80g protein in a day. Tried protein shakes and powder- not palatable. Not hungry in the morning. Recommended she may try low sugar greek yogurt, protein-enriched oatmeal, [place] cakes.    Sugar cravings have not abated. More fruit and juices.     Current Weight 195 lb (lowest 181lb on 10mg mounjaro) (228 lb when starting Mounjaro)    Exerciseno  Hairline fracture in foot has healed. Walking more. Not back to biking    SECONDARY PREVENTION  - Statin? Atorvastatin 40 mg   - ACE-I/ARB? Lisinopril 20 mg   - Aspirin? No    -The 10-year ASCVD risk score (Clay MCDERMOTT, et al., 2019) is: 15.2%    Values used to calculate the score:      Age: 69 years      Sex: Female      Is Non- : No      Diabetic: Yes      Tobacco smoker: No      Systolic Blood Pressure: 122 mmHg      Is BP treated: Yes      HDL Cholesterol: 44 mg/dL      Total " Cholesterol: 158 mg/dL      Current monitoring regimen:   Patient is using: glucometer     SMBG Fasting Readings from   From 4/28: 138. 155 1.54  149. 132. 156. 152. 145  From 3/21: 213, 135, 176, 146, 180, 179, 190, 199, 145, 147  From 2/3: 165, 169, 189, 212, 187, 171, 181, 168, 200, 161   From 12/12: Am 148, 112, 116, 146, 151, 141, 158, 214, 175, 123  From 11/25: No pills (5 days/10) 119, 131, 144, 156, 176, 173, 144, 167, 168, 137, 138.   From 10/28: couple days missed. Guests from out of town. 149, 174, 127, 129   From 9/23: 124, 108, 108, 161, 93, 116.   From 9/11: 166 208 196 147 135 184   8/1/24: 132, 169, 174, 144, 119, 131, 157, 166, 176, 159, 190, 90. 150, 183.   SMBG:   Noted that when readings are very high, she usually skipped her pills the day before     Pertinent PMH Review:  - PMH of Pancreatitis: no  - PMH/FH of Medullary Thyroid Cancer: no  - PMH of Retinopathy: no      VAF Application    Medication(s): Mounjaro, Vascepa    Application response: [Approved]    Approved until: 6/28/2025    Additional information:  - Patient will get this prescription mailed to them from  pharmacies to receive medication at no cost  - Patient will need to re-enroll in this program prior to expiration date to maintain coverage    Review of Systems      Objective     LMP  (LMP Unknown)    BP Readings from Last 4 Encounters:   03/05/25 122/72   02/11/25 122/76   02/04/25 116/74   12/24/24 138/70      There were no vitals filed for this visit.     Labs  Lab Results   Component Value Date    BILITOT 0.6 02/10/2025    CALCIUM 9.3 02/10/2025    CO2 29 02/10/2025     02/10/2025    CREATININE 0.78 02/10/2025    GLUCOSE 183 (H) 02/10/2025    ALKPHOS 92 02/10/2025    K 4.4 02/10/2025    PROT 6.3 02/10/2025     02/10/2025    AST 18 02/10/2025    ALT 13 02/10/2025    BUN 10 02/10/2025    ANIONGAP 7 02/10/2025    ALBUMIN 4.0 02/10/2025    LIPASE 25 12/24/2024    GFRF 75 08/22/2023     Lab Results   Component Value  "Date    TRIG 217 (H) 02/10/2025    CHOL 158 02/10/2025    LDLCALC 83 02/10/2025    HDL 44 (L) 02/10/2025     Lab Results   Component Value Date    HGBA1C 8.0 (H) 02/10/2025       Current Outpatient Medications   Medication Instructions    atorvastatin (LIPITOR) 40 mg, oral, Daily    blood sugar diagnostic (OneTouch Ultra Test) strip test once daily    cyanocobalamin (VITAMIN B-12) 2,000 mcg, Daily    ferrous sulfate 325 mg, 3 times weekly    icosapent ethyL (VASCEPA) 2 g, oral, 2 times daily (morning and late afternoon)    lisinopril 20 mg, oral, Daily    metFORMIN XR (GLUCOPHAGE-XR) 2,000 mg, oral, Daily    Mounjaro 12.5 mg, subcutaneous, Every 7 days, INJECT 12.5 MG (ONE PEN) UNDER THE SKIN ONCE WEEKLY    pioglitazone (ACTOS) 30 mg, oral, Daily    venlafaxine XR (EFFEXOR-XR) 75 mg, oral, Daily, Take with food.         Drug Interactions;  None at time of review    Assessment/Plan   Problem List Items Addressed This Visit       Type 2 diabetes mellitus without complication, without long-term current use of insulin    Most recent A1c worsened to 8% from 6.3% after taking a break from Mounjaro and restarting titration due to severity of side effects at 10mg dose. Now back on Mounjaro 10 mg, but recent home BG readings have been elevated (FBG 140s-150s). Patient feels like current pen is a \"placebo\"- does not feel any side effects but also no appetite suppression, BG control. Increase Mounjaro dose today. Will reduce dose back to 10mg if side effects recur.    Plan:  CONTINUE   Metformin XL 2000 mg daily  Pioglitazone 30mg once daily  INCREASE  Mounjaro 12.5mg weekly    Monitoring and Education  Counseled on limiting sugary beverages and foods.   Improve adherence to oral medications  Discussed strategies to prevent constipation including eating fiber and using Miralax at first sign of constipation.  Educated on increasing protein consumption and decreasing consumption of carbs          Relevant Medications    " tirzepatide (Mounjaro) 12.5 mg/0.5 mL pen injector    Other Relevant Orders    Referral to Clinical Pharmacy         Follow-up: 5/27/25 9:00am  wants to move back to Mondays after this visit     Time spent with pt: Total length of time 20(minutes) of the encounter and more than 50% was spent counseling the patient.    Joceline Sepulveda, PharmD    Continue all meds under the continuation of care with the referring provider and clinical pharmacy team.

## 2025-04-28 ENCOUNTER — APPOINTMENT (OUTPATIENT)
Dept: ENDOCRINOLOGY | Facility: CLINIC | Age: 70
End: 2025-04-28
Payer: MEDICARE

## 2025-04-28 DIAGNOSIS — E11.9 TYPE 2 DIABETES MELLITUS WITHOUT COMPLICATION, WITHOUT LONG-TERM CURRENT USE OF INSULIN: ICD-10-CM

## 2025-04-28 PROCEDURE — RXMED WILLOW AMBULATORY MEDICATION CHARGE

## 2025-04-28 RX ORDER — TIRZEPATIDE 12.5 MG/.5ML
12.5 INJECTION, SOLUTION SUBCUTANEOUS
Qty: 2 ML | Refills: 1 | Status: SHIPPED | OUTPATIENT
Start: 2025-04-28 | End: 2025-06-23

## 2025-04-28 NOTE — ASSESSMENT & PLAN NOTE
"Most recent A1c worsened to 8% from 6.3% after taking a break from Mounjaro and restarting titration due to severity of side effects at 10mg dose. Now back on Mounjaro 10 mg, but recent home BG readings have been elevated (FBG 140s-150s). Patient feels like current pen is a \"placebo\"- does not feel any side effects but also no appetite suppression, BG control. Increase Mounjaro dose today. Will reduce dose back to 10mg if side effects recur.    Plan:  CONTINUE   Metformin XL 2000 mg daily  Pioglitazone 30mg once daily  INCREASE  Mounjaro 12.5mg weekly    Monitoring and Education  Counseled on limiting sugary beverages and foods.   Improve adherence to oral medications  Discussed strategies to prevent constipation including eating fiber and using Miralax at first sign of constipation.  Educated on increasing protein consumption and decreasing consumption of carbs   "

## 2025-04-30 ENCOUNTER — PHARMACY VISIT (OUTPATIENT)
Dept: PHARMACY | Facility: CLINIC | Age: 70
End: 2025-04-30
Payer: COMMERCIAL

## 2025-05-27 ENCOUNTER — APPOINTMENT (OUTPATIENT)
Dept: PHARMACY | Facility: HOSPITAL | Age: 70
End: 2025-05-27
Payer: MEDICARE

## 2025-05-27 DIAGNOSIS — E11.9 TYPE 2 DIABETES MELLITUS WITHOUT COMPLICATION, WITHOUT LONG-TERM CURRENT USE OF INSULIN: ICD-10-CM

## 2025-05-27 PROCEDURE — RXMED WILLOW AMBULATORY MEDICATION CHARGE

## 2025-05-27 RX ORDER — TIRZEPATIDE 12.5 MG/.5ML
12.5 INJECTION, SOLUTION SUBCUTANEOUS
Qty: 2 ML | Refills: 0 | Status: SHIPPED | OUTPATIENT
Start: 2025-05-27 | End: 2025-06-25

## 2025-05-27 NOTE — ASSESSMENT & PLAN NOTE
"Most recent A1c worsened to 8% from 6.3% after taking a break from Mounjaro and restarting titration due to severity of side effects at 10mg dose. Now back on Mounjaro 10 mg, but recent home BG readings have been elevated (FBG 140s-150s). Patient feels like current pen is a \"placebo\"- does not feel any side effects but also no appetite suppression, BG control. Increase Mounjaro dose today. Will reduce dose back to 10mg if side effects recur.    Plan:  CONTINUE   Metformin XL 2000 mg daily  Pioglitazone 30mg once daily  INCREASE  Mounjaro 12.5mg weekly    Monitoring and Education  Counseled on limiting sugary beverages and foods.   Improve adherence to oral medications  Discussed strategies to prevent constipation including eating fiber and using Miralax at first sign of constipation.  Educated on increasing protein consumption and decreasing consumption of carbs   "
yes

## 2025-05-27 NOTE — PROGRESS NOTES
"Patient ID: Kaity Eric is a 69 y.o. female who presents for Diabetes    Referring Provider: Arthur Vargas*  PCP: Mei Taylor DO   Last visit with PCP: 12/16/2024 Next visit with PCP: 6/16/25    Endocrinologist: Dr. Osman Cr (next visit 9/17/25)    Subjective     Today, patient reports that she is tolerating Mounjaro 12.5mg vwell; however, does not feel the same appetite suppression when she took this dose previously. Denies any upset stomach, N/V/D. Complains of some constipation. Completed 4 doses. Feels \"ravenous\" has \"hunger pains\". Diet has slipped- eating candy.     States she stopped hydrochlorothiazide.     Weight steady at 195b.    One week delay- sister's son got . 1 side effect. Extreme lower back pain and constipation.   12.5mg has not abated appetite. Only a few episodes of nausea and vomiting. Itching better.     Diabetes    Current Pharmacotherapy:   Metformin XL 2000 mg daily  Pioglitazone 30mg daily  Mounjaro 12.5mg once weekly (last one this morning)     Discontinued: glimepiride, Jardiance 25mg, Farxiga 5mg    Diet  Encouraged patient to increase consumption of protein and vegetable and decrease consumption of carbohydrates. Patient working to eat 80g protein in a day. Tried protein shakes and powder- not palatable. Not hungry in the morning. Recommended she may try low sugar greek yogurt, protein-enriched oatmeal, [place] cakes.    Increase fiber    Sugar cravings have not abated. More fruit and juices.     Current Weight 195 lb (lowest 181lb on 10mg mounjaro) (228 lb when starting Mounjaro)    Exerciseno  Hairline fracture in foot has healed. Walking more. Not back to biking  Constant walking but not at aerobic rate.   Now that summer is here, might walk more  Drinking plenty of water    SECONDARY PREVENTION  - Statin? Atorvastatin 40 mg   - ACE-I/ARB? Lisinopril 20 mg   - Aspirin? No    -The 10-year ASCVD risk score (Clay MCDERMOTT, et al., 2019) is: 15.2%    " Values used to calculate the score:      Age: 69 years      Sex: Female      Is Non- : No      Diabetic: Yes      Tobacco smoker: No      Systolic Blood Pressure: 122 mmHg      Is BP treated: Yes      HDL Cholesterol: 44 mg/dL      Total Cholesterol: 158 mg/dL      Current monitoring regimen:   Patient is using: glucometer     SMBG Fasting Readings from   From 5/27/25: 151, 141, 119, 128, 126, 137  From 4/28: 138. 155, 154, 149. 132. 156. 152. 145  From 3/21: 213, 135, 176, 146, 180, 179, 190, 199, 145, 147  From 2/3: 165, 169, 189, 212, 187, 171, 181, 168, 200, 161   From 12/12: Am 148, 112, 116, 146, 151, 141, 158, 214, 175, 123  From 11/25: No pills (5 days/10) 119, 131, 144, 156, 176, 173, 144, 167, 168, 137, 138.   From 10/28: couple days missed. Guests from out of town. 149, 174, 127, 129   From 9/23: 124, 108, 108, 161, 93, 116.   From 9/11: 166 208 196 147 135 184   8/1/24: 132, 169, 174, 144, 119, 131, 157, 166, 176, 159, 190, 90. 150, 183.   SMBG:   Noted that when readings are very high, she usually skipped her pills the day before     Pertinent PMH Review:  - PMH of Pancreatitis: no  - PMH/FH of Medullary Thyroid Cancer: no  - PMH of Retinopathy: no      VAF Application    Medication(s): Mounjaro, Vascepa    Application response: [Approved]    Approved until: 6/28/2025    Additional information:  - Patient will get this prescription mailed to them from  pharmacies to receive medication at no cost  - Patient will need to re-enroll in this program prior to expiration date to maintain coverage    Review of Systems      Objective     LMP  (LMP Unknown)    BP Readings from Last 4 Encounters:   03/05/25 122/72   02/11/25 122/76   02/04/25 116/74   12/24/24 138/70      There were no vitals filed for this visit.     Labs  Lab Results   Component Value Date    BILITOT 0.6 02/10/2025    CALCIUM 9.3 02/10/2025    CO2 29 02/10/2025     02/10/2025    CREATININE 0.78 02/10/2025     "GLUCOSE 183 (H) 02/10/2025    ALKPHOS 92 02/10/2025    K 4.4 02/10/2025    PROT 6.3 02/10/2025     02/10/2025    AST 18 02/10/2025    ALT 13 02/10/2025    BUN 10 02/10/2025    ANIONGAP 7 02/10/2025    ALBUMIN 4.0 02/10/2025    LIPASE 25 12/24/2024    GFRF 75 08/22/2023     Lab Results   Component Value Date    TRIG 217 (H) 02/10/2025    CHOL 158 02/10/2025    LDLCALC 83 02/10/2025    HDL 44 (L) 02/10/2025     Lab Results   Component Value Date    HGBA1C 8.0 (H) 02/10/2025       Current Outpatient Medications   Medication Instructions    atorvastatin (LIPITOR) 40 mg, oral, Daily    blood sugar diagnostic (OneTouch Ultra Test) strip test once daily    cyanocobalamin (VITAMIN B-12) 2,000 mcg, Daily    ferrous sulfate 325 mg, 3 times weekly    icosapent ethyL (VASCEPA) 2 g, oral, 2 times daily (morning and late afternoon)    lisinopril 20 mg, oral, Daily    metFORMIN XR (GLUCOPHAGE-XR) 2,000 mg, oral, Daily    Mounjaro 12.5 mg, subcutaneous, Every 7 days, INJECT 12.5 MG (ONE PEN) UNDER THE SKIN ONCE WEEKLY    pioglitazone (ACTOS) 30 mg, oral, Daily    venlafaxine XR (EFFEXOR-XR) 75 mg, oral, Daily, Take with food.         Drug Interactions;  None at time of review    Assessment/Plan   Problem List Items Addressed This Visit       Type 2 diabetes mellitus without complication, without long-term current use of insulin    Relevant Medications    tirzepatide (Mounjaro) 12.5 mg/0.5 mL pen injector    Other Relevant Orders    Referral to Clinical Pharmacy       Most recent A1c worsened to 8% from 6.3% after taking a break from Mounjaro and restarting titration due to severity of side effects at 10mg dose. Now up to Mounjaro 12.5mg. Patient feels like current pen is a \"placebo\"- does not feel any nausea, but also no appetite suppression. BG have improved since last visit. Experiencing some constipation. Advised patient to  fiber gummies and use miralax as needed. One more month on current dose then consider " increasing to max dose.   Plan:  CONTINUE   Metformin XL 2000 mg daily  Pioglitazone 30mg once daily  Mounjaro 12.5mg weekly    Monitoring and Education  Counseled on limiting sugary beverages and foods.   Improve adherence to oral medications  Discussed strategies to prevent constipation including eating fiber and using Miralax at first sign of constipation.  Educated on increasing protein consumption and decreasing consumption of carbs       Follow-up: 7/1/25 9:00am      Time spent with pt: Total length of time 20(minutes) of the encounter and more than 50% was spent counseling the patient.    Joceline Sepulveda, PharmD    Continue all meds under the continuation of care with the referring provider and clinical pharmacy team.

## 2025-05-28 ENCOUNTER — PHARMACY VISIT (OUTPATIENT)
Dept: PHARMACY | Facility: CLINIC | Age: 70
End: 2025-05-28
Payer: COMMERCIAL

## 2025-06-16 ENCOUNTER — APPOINTMENT (OUTPATIENT)
Dept: PRIMARY CARE | Facility: CLINIC | Age: 70
End: 2025-06-16
Payer: MEDICARE

## 2025-06-16 VITALS
OXYGEN SATURATION: 99 % | DIASTOLIC BLOOD PRESSURE: 80 MMHG | SYSTOLIC BLOOD PRESSURE: 118 MMHG | TEMPERATURE: 97.4 F | HEART RATE: 78 BPM

## 2025-06-16 DIAGNOSIS — N95.1 MENOPAUSE SYNDROME: ICD-10-CM

## 2025-06-16 DIAGNOSIS — E11.9 TYPE 2 DIABETES MELLITUS WITHOUT COMPLICATION, WITHOUT LONG-TERM CURRENT USE OF INSULIN: ICD-10-CM

## 2025-06-16 DIAGNOSIS — E78.1 HYPERTRIGLYCERIDEMIA: ICD-10-CM

## 2025-06-16 DIAGNOSIS — J01.90 ACUTE NON-RECURRENT SINUSITIS, UNSPECIFIED LOCATION: Primary | ICD-10-CM

## 2025-06-16 DIAGNOSIS — R05.3 CHRONIC COUGH: ICD-10-CM

## 2025-06-16 PROCEDURE — 1159F MED LIST DOCD IN RCRD: CPT | Performed by: FAMILY MEDICINE

## 2025-06-16 PROCEDURE — 3074F SYST BP LT 130 MM HG: CPT | Performed by: FAMILY MEDICINE

## 2025-06-16 PROCEDURE — 99214 OFFICE O/P EST MOD 30 MIN: CPT | Performed by: FAMILY MEDICINE

## 2025-06-16 PROCEDURE — 1036F TOBACCO NON-USER: CPT | Performed by: FAMILY MEDICINE

## 2025-06-16 PROCEDURE — 1160F RVW MEDS BY RX/DR IN RCRD: CPT | Performed by: FAMILY MEDICINE

## 2025-06-16 PROCEDURE — G2211 COMPLEX E/M VISIT ADD ON: HCPCS | Performed by: FAMILY MEDICINE

## 2025-06-16 PROCEDURE — 4010F ACE/ARB THERAPY RXD/TAKEN: CPT | Performed by: FAMILY MEDICINE

## 2025-06-16 PROCEDURE — 3079F DIAST BP 80-89 MM HG: CPT | Performed by: FAMILY MEDICINE

## 2025-06-16 RX ORDER — METFORMIN HYDROCHLORIDE 500 MG/1
2000 TABLET, EXTENDED RELEASE ORAL DAILY
Qty: 360 TABLET | Refills: 1 | Status: CANCELLED | OUTPATIENT
Start: 2025-06-16 | End: 2025-12-13

## 2025-06-16 RX ORDER — BLOOD SUGAR DIAGNOSTIC
STRIP MISCELLANEOUS
Qty: 100 STRIP | Refills: 3 | Status: SHIPPED | OUTPATIENT
Start: 2025-06-16

## 2025-06-16 RX ORDER — VENLAFAXINE HYDROCHLORIDE 75 MG/1
75 CAPSULE, EXTENDED RELEASE ORAL DAILY
Qty: 90 CAPSULE | Refills: 1 | Status: SHIPPED | OUTPATIENT
Start: 2025-06-16 | End: 2025-12-13

## 2025-06-16 RX ORDER — ALBUTEROL SULFATE 90 UG/1
2 INHALANT RESPIRATORY (INHALATION) EVERY 4 HOURS PRN
Qty: 8.5 G | Refills: 0 | Status: SHIPPED | OUTPATIENT
Start: 2025-06-16 | End: 2026-06-16

## 2025-06-16 RX ORDER — ICOSAPENT ETHYL 1 G/1
2 CAPSULE ORAL
Qty: 360 CAPSULE | Refills: 1 | Status: SHIPPED | OUTPATIENT
Start: 2025-06-16 | End: 2025-12-13

## 2025-06-16 RX ORDER — AMOXICILLIN AND CLAVULANATE POTASSIUM 875; 125 MG/1; MG/1
875 TABLET, FILM COATED ORAL 2 TIMES DAILY
Qty: 20 TABLET | Refills: 0 | Status: SHIPPED | OUTPATIENT
Start: 2025-06-16 | End: 2025-06-26

## 2025-06-16 ASSESSMENT — ENCOUNTER SYMPTOMS
HEADACHES: 0
NAUSEA: 0
DYSURIA: 0
COUGH: 1
SLEEP DISTURBANCE: 0
SHORTNESS OF BREATH: 0
CONSTIPATION: 0
DYSPHORIC MOOD: 0
POLYPHAGIA: 0
ABDOMINAL PAIN: 0
POLYDIPSIA: 0
DIARRHEA: 0
DIZZINESS: 0
VOMITING: 0
FATIGUE: 0
MYALGIAS: 0
PALPITATIONS: 0

## 2025-06-16 NOTE — PROGRESS NOTES
Subjective   Patient ID: Kaity Eric is a 69 y.o. female who presents for Diabetes (Recheck), Hypertension, and Cough (X 2 weeks) and multiple issues    HPI   Diabetes/lipids: Due for recheck.  Recent cut back on B12.  Does notice tingling in feet when not on medication    Mood/menopause: Stable on meds.    Cough: Onset x 2 weeks.  Positive sick contacts.  No fevers or chills.  Cough slightly improving but persistent.  No associated chest pain or shortness of breath.  No nausea or vomiting.  No recent antibiotics or travel  Review of Systems   Constitutional:  Negative for fatigue.   HENT: Negative.     Eyes:  Negative for visual disturbance.   Respiratory:  Positive for cough. Negative for shortness of breath.    Cardiovascular:  Negative for chest pain and palpitations.   Gastrointestinal:  Negative for abdominal pain, constipation, diarrhea, nausea and vomiting.   Endocrine: Negative for polydipsia, polyphagia and polyuria.   Genitourinary:  Negative for dysuria.   Musculoskeletal:  Negative for myalgias.   Skin:  Negative for rash.   Neurological:  Negative for dizziness and headaches.   Psychiatric/Behavioral:  Negative for dysphoric mood and sleep disturbance.        Objective   /80   Pulse 78   Temp 36.3 °C (97.4 °F)   LMP  (LMP Unknown)   SpO2 99%     Physical Exam  Vitals and nursing note reviewed.   Constitutional:       General: She is not in acute distress.     Appearance: Normal appearance.   HENT:      Head: Normocephalic.      Right Ear: Tympanic membrane normal.      Left Ear: Tympanic membrane normal.      Nose: Nose normal.      Mouth/Throat:      Pharynx: Oropharynx is clear.   Eyes:      General: No scleral icterus.     Pupils: Pupils are equal, round, and reactive to light.   Neck:      Vascular: No carotid bruit.   Cardiovascular:      Rate and Rhythm: Normal rate and regular rhythm.      Heart sounds: No murmur heard.  Pulmonary:      Effort: Pulmonary effort is normal. No  respiratory distress.      Breath sounds: Wheezing present.   Abdominal:      Tenderness: There is no abdominal tenderness. There is no guarding.   Musculoskeletal:         General: No tenderness.      Cervical back: Neck supple.      Right lower leg: No edema.      Left lower leg: No edema.   Lymphadenopathy:      Cervical: No cervical adenopathy.   Skin:     General: Skin is warm.   Neurological:      General: No focal deficit present.      Mental Status: She is alert.      Cranial Nerves: No cranial nerve deficit.   Psychiatric:         Mood and Affect: Mood normal.         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  Side effect of new med.  Stable on meds otherwise.  Recommendations given

## 2025-06-16 NOTE — PATIENT INSTRUCTIONS
Recommend a predominant low fat whole foods plant based diet.  Cut back on meat, dairy, processed carbs, salt and oils(especially palm and coconut). Increase fiber in your diet.  Decrease alcohol as much as possible if you drink. Recommend regular exercise most days of the week(goal up to 150min per week). Also recommend good sleep habits aiming for 7-8 hours per night.     Obtain your blood work    Continue your current meds    Follow up with your specialists as scheduled    Start antibiotic if symptoms worsening. Use albuterol as needed    Go to the ER if any of your symptoms are significantly worsening.     Return in 6 months, sooner if needed

## 2025-07-01 ENCOUNTER — APPOINTMENT (OUTPATIENT)
Dept: PHARMACY | Facility: HOSPITAL | Age: 70
End: 2025-07-01
Payer: MEDICARE

## 2025-07-01 DIAGNOSIS — E78.1 HYPERTRIGLYCERIDEMIA: ICD-10-CM

## 2025-07-01 DIAGNOSIS — E11.9 TYPE 2 DIABETES MELLITUS WITHOUT COMPLICATION, WITHOUT LONG-TERM CURRENT USE OF INSULIN: Primary | ICD-10-CM

## 2025-07-01 RX ORDER — METFORMIN HYDROCHLORIDE 500 MG/1
2000 TABLET, EXTENDED RELEASE ORAL DAILY
Qty: 360 TABLET | Refills: 3 | Status: SHIPPED | OUTPATIENT
Start: 2025-07-01 | End: 2026-07-01

## 2025-07-01 RX ORDER — TIRZEPATIDE 15 MG/.5ML
15 INJECTION, SOLUTION SUBCUTANEOUS WEEKLY
Qty: 2 ML | Refills: 1 | Status: SHIPPED | OUTPATIENT
Start: 2025-07-01 | End: 2025-08-26

## 2025-07-01 RX ORDER — ICOSAPENT ETHYL 1 G/1
2 CAPSULE ORAL
Qty: 360 CAPSULE | Refills: 3 | Status: SHIPPED | OUTPATIENT
Start: 2025-07-01 | End: 2026-06-26

## 2025-07-01 NOTE — PROGRESS NOTES
Patient ID: Kaity Eric is a 69 y.o. female who presents for Diabetes    Referring Provider: Arthur Vargas*  PCP: Mei Taylor DO     Endocrinologist: Dr. Osman Cr (next visit 9/17/25)    Subjective     Today, patient reports that she is tolerating Mounjaro 12.5mg well.    Lots of driving next week- moving nephew. When really stressed, that's when stomach starts acting up.  Has one 10mg left. One 12.5mg left  Constipation better. Taking Miralax pretty regularly. 3-4 times/wk   Feels normal. Knows when she's hungry. Stress related.   Trouble swallowing pills. Has oblong metformin. Wants to try rounds. Not as vigilant with pills.     Weight steady at 184b.    Diabetes    Current Pharmacotherapy:   Metformin XL 2000 mg daily  Pioglitazone 30mg daily  Mounjaro 12.5mg once weekly   Discontinued: glimepiride, Jardiance 25mg, Farxiga 5mg    Diet  Encouraged patient to increase consumption of protein and vegetable and decrease consumption of carbohydrates. Patient working to eat 80g protein in a day. Tried protein shakes and powder- not palatable. Not hungry in the morning. Recommended she may try low sugar greek yogurt, protein-enriched oatmeal, [place] cakes.    Increase fiber    Sugar cravings have not abated. More fruit and juices.     Current Weight 195 lb (lowest 181lb on 10mg mounjaro) (228 lb when starting Mounjaro)    Exercise  Hairline fracture in foot has healed. Walking more. Not back to biking  Constant walking but not at aerobic rate.   Now that summer is here, might walk more  Drinking plenty of water    SECONDARY PREVENTION  - Statin? Atorvastatin 40 mg   - ACE-I/ARB? Lisinopril 20 mg   - Aspirin? No    -The 10-year ASCVD risk score (Clay MCDERMOTT, et al., 2019) is: 14.3%    Values used to calculate the score:      Age: 69 years      Sex: Female      Is Non- : No      Diabetic: Yes      Tobacco smoker: No      Systolic Blood Pressure: 118 mmHg      Is BP  treated: Yes      HDL Cholesterol: 44 mg/dL      Total Cholesterol: 158 mg/dL      Current monitoring regimen:   Patient is using: glucometer     SMBG Fasting Readings from   7/1/25: 140, 105, 172, 155, 145, 108, 117, 137... 151, 136, 158, 152   From 5/27/25: 151, 141, 119, 128, 126, 137  From 4/28: 138. 155, 154, 149. 132. 156. 152. 145  From 3/21: 213, 135, 176, 146, 180, 179, 190, 199, 145, 147  From 2/3: 165, 169, 189, 212, 187, 171, 181, 168, 200, 161   From 12/12: Am 148, 112, 116, 146, 151, 141, 158, 214, 175, 123  From 11/25: No pills (5 days/10) 119, 131, 144, 156, 176, 173, 144, 167, 168, 137, 138.   From 10/28: couple days missed. Guests from out of town. 149, 174, 127, 129   From 9/23: 124, 108, 108, 161, 93, 116.   From 9/11: 166 208 196 147 135 184   8/1/24: 132, 169, 174, 144, 119, 131, 157, 166, 176, 159, 190, 90. 150, 183.   SMBG:   Noted that when readings are very high, she usually skipped her pills the day before     Pertinent PMH Review:  - PMH of Pancreatitis: no  - PMH/FH of Medullary Thyroid Cancer: no  - PMH of Retinopathy: no      VAF Application    Medication(s): Mounjaro, Vascepa    Application response: [Approved]    Approved until: 6/28/2025    Additional information:  - Patient will get this prescription mailed to them from  pharmacies to receive medication at no cost  - Patient will need to re-enroll in this program prior to expiration date to maintain coverage    Review of Systems      Objective     LMP  (LMP Unknown)    BP Readings from Last 4 Encounters:   06/16/25 118/80   03/05/25 122/72   02/11/25 122/76   02/04/25 116/74      There were no vitals filed for this visit.     Labs  Lab Results   Component Value Date    BILITOT 0.6 02/10/2025    CALCIUM 9.3 02/10/2025    CO2 29 02/10/2025     02/10/2025    CREATININE 0.78 02/10/2025    GLUCOSE 183 (H) 02/10/2025    ALKPHOS 92 02/10/2025    K 4.4 02/10/2025    PROT 6.3 02/10/2025     02/10/2025    AST 18 02/10/2025     "ALT 13 02/10/2025    BUN 10 02/10/2025    ANIONGAP 7 02/10/2025    ALBUMIN 4.0 02/10/2025    LIPASE 25 12/24/2024    GFRF 75 08/22/2023     Lab Results   Component Value Date    TRIG 217 (H) 02/10/2025    CHOL 158 02/10/2025    LDLCALC 83 02/10/2025    HDL 44 (L) 02/10/2025     Lab Results   Component Value Date    HGBA1C 8.0 (H) 02/10/2025       Current Outpatient Medications   Medication Instructions    albuterol (ProAir HFA) 90 mcg/actuation inhaler 2 puffs, inhalation, Every 4 hours PRN    atorvastatin (LIPITOR) 40 mg, oral, Daily    blood sugar diagnostic (OneTouch Ultra Test) test once daily    cyanocobalamin (VITAMIN B-12) 2,000 mcg, Daily    ferrous sulfate 325 mg, 3 times weekly    icosapent ethyL (VASCEPA) 2 g, oral, 2 times daily (morning and late afternoon)    lisinopril 20 mg, oral, Daily    metFORMIN XR (GLUCOPHAGE-XR) 2,000 mg, oral, Daily    Mounjaro 15 mg, subcutaneous, Weekly    pioglitazone (ACTOS) 30 mg, oral, Daily    venlafaxine XR (EFFEXOR-XR) 75 mg, oral, Daily, Take with food.         Drug Interactions;  None at time of review    Assessment/Plan   Problem List Items Addressed This Visit       Type 2 diabetes mellitus without complication, without long-term current use of insulin - Primary    Relevant Medications    tirzepatide (Mounjaro) 15 mg/0.5 mL pen injector         Most recent A1c worsened to 8% from 6.3% after taking a break from Mounjaro and restarting titration due to severity of side effects at 10mg dose. Now up to Mounjaro 12.5mg. Patient feels like current pen is a \"placebo\"- does not feel any nausea, but also no appetite suppression. BG have improved since last visit. Wants to increase dose.  Plan:  CONTINUE   Metformin XL 2000 mg daily  Pioglitazone 30mg once daily  INCREASE  Mounjaro 15mg weekly    Monitoring and Education  Counseled on limiting sugary beverages and foods.   Improve adherence to oral medications  Discussed strategies to prevent constipation including eating " fiber and using Miralax at first sign of constipation.  Educated on increasing protein consumption and decreasing consumption of carbs       Follow-up: 7/29/25 9:00am      Time spent with pt: Total length of time 20(minutes) of the encounter and more than 50% was spent counseling the patient.    Joceline Sepulveda, PharmD    Continue all meds under the continuation of care with the referring provider and clinical pharmacy team.

## 2025-07-02 LAB
ALBUMIN SERPL-MCNC: 4.2 G/DL (ref 3.6–5.1)
ALP SERPL-CCNC: 86 U/L (ref 37–153)
ALT SERPL-CCNC: 12 U/L (ref 6–29)
ANION GAP SERPL CALCULATED.4IONS-SCNC: 10 MMOL/L (CALC) (ref 7–17)
AST SERPL-CCNC: 16 U/L (ref 10–35)
BILIRUB SERPL-MCNC: 0.7 MG/DL (ref 0.2–1.2)
BUN SERPL-MCNC: 12 MG/DL (ref 7–25)
CALCIUM SERPL-MCNC: 9.5 MG/DL (ref 8.6–10.4)
CHLORIDE SERPL-SCNC: 104 MMOL/L (ref 98–110)
CHOLEST SERPL-MCNC: 165 MG/DL
CHOLEST/HDLC SERPL: 3.8 (CALC)
CO2 SERPL-SCNC: 25 MMOL/L (ref 20–32)
CREAT SERPL-MCNC: 0.8 MG/DL (ref 0.5–1.05)
EGFRCR SERPLBLD CKD-EPI 2021: 81 ML/MIN/1.73M2
EST. AVERAGE GLUCOSE BLD GHB EST-MCNC: 140 MG/DL
EST. AVERAGE GLUCOSE BLD GHB EST-SCNC: 7.7 MMOL/L
GLUCOSE SERPL-MCNC: 149 MG/DL (ref 65–99)
HBA1C MFR BLD: 6.5 %
HDLC SERPL-MCNC: 44 MG/DL
LDLC SERPL CALC-MCNC: 100 MG/DL (CALC)
NONHDLC SERPL-MCNC: 121 MG/DL (CALC)
POTASSIUM SERPL-SCNC: 4.5 MMOL/L (ref 3.5–5.3)
PROT SERPL-MCNC: 7 G/DL (ref 6.1–8.1)
SODIUM SERPL-SCNC: 139 MMOL/L (ref 135–146)
TRIGL SERPL-MCNC: 115 MG/DL
VIT B12 SERPL-MCNC: 658 PG/ML (ref 200–1100)

## 2025-07-03 PROCEDURE — RXMED WILLOW AMBULATORY MEDICATION CHARGE

## 2025-07-09 ENCOUNTER — TELEPHONE (OUTPATIENT)
Dept: PRIMARY CARE | Facility: CLINIC | Age: 70
End: 2025-07-09
Payer: MEDICARE

## 2025-07-09 NOTE — TELEPHONE ENCOUNTER
----- Message from Mei Taylor sent at 7/8/2025 11:33 PM EDT -----  Tell patient that her blood sugars improved.  A1c 6.5 down from 8.  Cholesterol overall controlled.  Follow-up as scheduled sooner if needed  ----- Message -----  From: [employer] Results In  Sent: 7/2/2025   5:07 AM EDT  To: Mei Taylor, DO

## 2025-07-09 NOTE — TELEPHONE ENCOUNTER
----- Message from Mei Taylor sent at 7/8/2025 11:33 PM EDT -----  Tell patient that her blood sugars improved.  A1c 6.5 down from 8.  Cholesterol overall controlled.  Follow-up as scheduled sooner if needed  ----- Message -----  From: [facility] Results In  Sent: 7/2/2025   5:07 AM EDT  To: Mei Taylor, DO

## 2025-07-14 ENCOUNTER — PHARMACY VISIT (OUTPATIENT)
Dept: PHARMACY | Facility: CLINIC | Age: 70
End: 2025-07-14
Payer: COMMERCIAL

## 2025-07-29 ENCOUNTER — APPOINTMENT (OUTPATIENT)
Dept: PHARMACY | Facility: HOSPITAL | Age: 70
End: 2025-07-29
Payer: MEDICARE

## 2025-07-29 DIAGNOSIS — E11.9 TYPE 2 DIABETES MELLITUS WITHOUT COMPLICATION, WITHOUT LONG-TERM CURRENT USE OF INSULIN: ICD-10-CM

## 2025-07-29 PROCEDURE — RXMED WILLOW AMBULATORY MEDICATION CHARGE

## 2025-07-29 RX ORDER — TIRZEPATIDE 15 MG/.5ML
15 INJECTION, SOLUTION SUBCUTANEOUS WEEKLY
Qty: 6 ML | Refills: 3 | Status: SHIPPED | OUTPATIENT
Start: 2025-07-29 | End: 2026-07-29

## 2025-07-29 NOTE — PROGRESS NOTES
Patient ID: Kaity Eric is a 69 y.o. female who presents for Diabetes    Referring Provider: Arthur Vargas*  PCP: Mei Taylor DO     Endocrinologist: Dr. Osman Cr (next visit 9/17/25)    Subjective     Today, patient reports that she is tolerating Mounjaro 15mg well.    Constipation better. Taking Miralax pretty regularly. 3-4 times/wk   Feels normal. Knows when she's hungry. Stress related.   Trouble swallowing pills. Has oblong metformin. Wants to try rounds. Not as vigilant with pills.     Weight steady at 180 lb  , 146, 126, 132 when she is able to take pills    A few days of GERD. More appetite suppressant. Craving citrus fruit    Diabetes    Current Pharmacotherapy:   Metformin XL 2000 mg daily  Pioglitazone 30mg daily  Mounjaro 15mg once weekly   Discontinued: glimepiride, Jardiance 25mg, Farxiga 5mg    Diet  Encouraged patient to increase consumption of protein and vegetable and decrease consumption of carbohydrates. Patient working to eat 80g protein in a day. Tried protein shakes and powder- not palatable. Not hungry in the morning. Recommended she may try low sugar greek yogurt, protein-enriched oatmeal, [place] cakes.    Increase fiber    Current Weight 180 lb (lowest 181lb on 10mg mounjaro) (228 lb when starting Mounjaro)    Exercise  Hairline fracture in foot has healed. Walking more. Not back to biking  Constant walking but not at aerobic rate.   Now that summer is here, might walk more  Drinking plenty of water    SECONDARY PREVENTION  - Statin? Atorvastatin 40 mg   - ACE-I/ARB? Lisinopril 20 mg   - Aspirin? No    -The 10-year ASCVD risk score (Clay MCDERMOTT, et al., 2019) is: 14.5%    Values used to calculate the score:      Age: 69 years      Clincally relevant sex: Female      Is Non- : No      Diabetic: Yes      Tobacco smoker: No      Systolic Blood Pressure: 118 mmHg      Is BP treated: Yes      HDL Cholesterol: 44 mg/dL      Total  Cholesterol: 165 mg/dL      Current monitoring regimen:   Patient is using: glucometer     SMBG Fasting Readings from   7/1/25: 140, 105, 172, 155, 145, 108, 117, 137... 151, 136, 158, 152   From 5/27/25: 151, 141, 119, 128, 126, 137  From 4/28: 138. 155, 154, 149. 132. 156. 152. 145  From 3/21: 213, 135, 176, 146, 180, 179, 190, 199, 145, 147  From 2/3: 165, 169, 189, 212, 187, 171, 181, 168, 200, 161   From 12/12: Am 148, 112, 116, 146, 151, 141, 158, 214, 175, 123  From 11/25: No pills (5 days/10) 119, 131, 144, 156, 176, 173, 144, 167, 168, 137, 138.   From 10/28: couple days missed. Guests from out of town. 149, 174, 127, 129   From 9/23: 124, 108, 108, 161, 93, 116.   From 9/11: 166 208 196 147 135 184   8/1/24: 132, 169, 174, 144, 119, 131, 157, 166, 176, 159, 190, 90. 150, 183.   SMBG:   Noted that when readings are very high, she usually skipped her pills the day before     Pertinent PMH Review:  - PMH of Pancreatitis: no  - PMH/FH of Medullary Thyroid Cancer: no  - PMH of Retinopathy: no      VAF Application    Medication(s): Mounjaro, Vascepa    Application response: [Approved]    Approved until: 7/3/2026    Additional information:  - Patient will get this prescription mailed to them from  pharmacies to receive medication at no cost  - Patient will need to re-enroll in this program prior to expiration date to maintain coverage    Review of Systems      Objective     LMP  (LMP Unknown)    BP Readings from Last 4 Encounters:   06/16/25 118/80   03/05/25 122/72   02/11/25 122/76   02/04/25 116/74      There were no vitals filed for this visit.     Labs  Lab Results   Component Value Date    BILITOT 0.7 07/01/2025    CALCIUM 9.5 07/01/2025    CO2 25 07/01/2025     07/01/2025    CREATININE 0.80 07/01/2025    GLUCOSE 149 (H) 07/01/2025    ALKPHOS 86 07/01/2025    K 4.5 07/01/2025    PROT 7.0 07/01/2025     07/01/2025    AST 16 07/01/2025    ALT 12 07/01/2025    BUN 12 07/01/2025    ANIONGAP 10  07/01/2025    ALBUMIN 4.2 07/01/2025    LIPASE 25 12/24/2024    GFRF 75 08/22/2023     Lab Results   Component Value Date    TRIG 115 07/01/2025    CHOL 165 07/01/2025    LDLCALC 100 (H) 07/01/2025    HDL 44 (L) 07/01/2025     Lab Results   Component Value Date    HGBA1C 6.5 (H) 07/01/2025       Current Outpatient Medications   Medication Instructions    albuterol (ProAir HFA) 90 mcg/actuation inhaler 2 puffs, inhalation, Every 4 hours PRN    atorvastatin (LIPITOR) 40 mg, oral, Daily    blood sugar diagnostic (OneTouch Ultra Test) test once daily    cyanocobalamin (VITAMIN B-12) 2,000 mcg, Daily    ferrous sulfate 325 mg, 3 times weekly    lisinopril 20 mg, oral, Daily    metFORMIN XR (GLUCOPHAGE-XR) 2,000 mg, oral, Daily    Mounjaro 15 mg, subcutaneous, Weekly    pioglitazone (ACTOS) 30 mg, oral, Daily    Vascepa 2 g, oral, 2 times daily (morning and late afternoon)    venlafaxine XR (EFFEXOR-XR) 75 mg, oral, Daily, Take with food.         Drug Interactions;  None at time of review    Assessment/Plan   Problem List Items Addressed This Visit       Type 2 diabetes mellitus without complication, without long-term current use of insulin    Relevant Medications    tirzepatide (Mounjaro) 15 mg/0.5 mL pen injector       Most recent A1c improved from 8% to 6.5%. Now up to Mounjaro 15mg and tolerating well, but experiencing increased gag reflex when taking her pills. Continue current regimen.  Will request round metformin pills with next fill.  Plan:  CONTINUE   Metformin XL 2000 mg daily  Pioglitazone 30mg once daily  Mounjaro 15mg weekly (filling 3 months)    Monitoring and Education  Counseled on limiting sugary beverages and foods.   Improve adherence to oral medications  Discussed strategies to prevent constipation including eating fiber and using Miralax at first sign of constipation.  Educated on increasing protein consumption and decreasing consumption of carbs       Follow-up: 11/18/25 9:00am      Time spent with  pt: Total length of time 20(minutes) of the encounter and more than 50% was spent counseling the patient.    Joceline Sepulveda, PharmD    Continue all meds under the continuation of care with the referring provider and clinical pharmacy team.

## 2025-07-31 ENCOUNTER — PHARMACY VISIT (OUTPATIENT)
Dept: PHARMACY | Facility: CLINIC | Age: 70
End: 2025-07-31
Payer: COMMERCIAL

## 2025-08-12 ENCOUNTER — APPOINTMENT (OUTPATIENT)
Dept: CARDIOLOGY | Facility: HOSPITAL | Age: 70
End: 2025-08-12
Payer: MEDICARE

## 2025-08-19 ENCOUNTER — OFFICE VISIT (OUTPATIENT)
Dept: CARDIOLOGY | Facility: HOSPITAL | Age: 70
End: 2025-08-19
Payer: MEDICARE

## 2025-08-19 VITALS
HEART RATE: 72 BPM | HEIGHT: 65 IN | BODY MASS INDEX: 30.66 KG/M2 | SYSTOLIC BLOOD PRESSURE: 112 MMHG | WEIGHT: 184 LBS | DIASTOLIC BLOOD PRESSURE: 78 MMHG

## 2025-08-19 DIAGNOSIS — E11.9 TYPE 2 DIABETES MELLITUS WITHOUT COMPLICATION, WITHOUT LONG-TERM CURRENT USE OF INSULIN: ICD-10-CM

## 2025-08-19 DIAGNOSIS — I10 PRIMARY HYPERTENSION: Primary | Chronic | ICD-10-CM

## 2025-08-19 DIAGNOSIS — E78.1 HYPERTRIGLYCERIDEMIA: ICD-10-CM

## 2025-08-19 DIAGNOSIS — E78.5 HYPERLIPIDEMIA, UNSPECIFIED HYPERLIPIDEMIA TYPE: ICD-10-CM

## 2025-08-19 LAB
ATRIAL RATE: 72 BPM
P AXIS: 55 DEGREES
P OFFSET: 204 MS
P ONSET: 151 MS
PR INTERVAL: 140 MS
Q ONSET: 221 MS
QRS COUNT: 12 BEATS
QRS DURATION: 66 MS
QT INTERVAL: 402 MS
QTC CALCULATION(BAZETT): 440 MS
QTC FREDERICIA: 427 MS
R AXIS: 67 DEGREES
T AXIS: 63 DEGREES
T OFFSET: 422 MS
VENTRICULAR RATE: 72 BPM

## 2025-08-19 PROCEDURE — 1159F MED LIST DOCD IN RCRD: CPT | Performed by: INTERNAL MEDICINE

## 2025-08-19 PROCEDURE — 4010F ACE/ARB THERAPY RXD/TAKEN: CPT | Performed by: INTERNAL MEDICINE

## 2025-08-19 PROCEDURE — 3078F DIAST BP <80 MM HG: CPT | Performed by: INTERNAL MEDICINE

## 2025-08-19 PROCEDURE — 93005 ELECTROCARDIOGRAM TRACING: CPT | Performed by: INTERNAL MEDICINE

## 2025-08-19 PROCEDURE — 99214 OFFICE O/P EST MOD 30 MIN: CPT | Performed by: INTERNAL MEDICINE

## 2025-08-19 PROCEDURE — 99213 OFFICE O/P EST LOW 20 MIN: CPT | Performed by: INTERNAL MEDICINE

## 2025-08-19 PROCEDURE — 3008F BODY MASS INDEX DOCD: CPT | Performed by: INTERNAL MEDICINE

## 2025-08-19 PROCEDURE — 3074F SYST BP LT 130 MM HG: CPT | Performed by: INTERNAL MEDICINE

## 2025-08-19 RX ORDER — ATORVASTATIN CALCIUM 40 MG/1
40 TABLET, FILM COATED ORAL DAILY
Qty: 90 TABLET | Refills: 3 | Status: SHIPPED | OUTPATIENT
Start: 2025-08-19 | End: 2026-08-19

## 2025-08-19 RX ORDER — LISINOPRIL 20 MG/1
20 TABLET ORAL DAILY
Qty: 90 TABLET | Refills: 3 | Status: SHIPPED | OUTPATIENT
Start: 2025-08-19 | End: 2026-08-19

## 2025-08-19 RX ORDER — ICOSAPENT ETHYL 1 G/1
1 CAPSULE ORAL
Qty: 180 CAPSULE | Refills: 3 | Status: SHIPPED | OUTPATIENT
Start: 2025-08-19 | End: 2026-08-19

## 2025-08-26 ENCOUNTER — APPOINTMENT (OUTPATIENT)
Dept: ENDOCRINOLOGY | Facility: CLINIC | Age: 70
End: 2025-08-26
Payer: MEDICARE

## 2025-09-17 ENCOUNTER — APPOINTMENT (OUTPATIENT)
Dept: ENDOCRINOLOGY | Facility: CLINIC | Age: 70
End: 2025-09-17
Payer: MEDICARE

## 2025-11-18 ENCOUNTER — APPOINTMENT (OUTPATIENT)
Dept: PHARMACY | Facility: HOSPITAL | Age: 70
End: 2025-11-18
Payer: MEDICARE

## 2025-12-16 ENCOUNTER — APPOINTMENT (OUTPATIENT)
Dept: PRIMARY CARE | Facility: CLINIC | Age: 70
End: 2025-12-16
Payer: MEDICARE

## 2026-02-24 ENCOUNTER — APPOINTMENT (OUTPATIENT)
Dept: CARDIOLOGY | Facility: HOSPITAL | Age: 71
End: 2026-02-24
Payer: MEDICARE